# Patient Record
Sex: FEMALE | Race: WHITE | NOT HISPANIC OR LATINO | Employment: FULL TIME | URBAN - METROPOLITAN AREA
[De-identification: names, ages, dates, MRNs, and addresses within clinical notes are randomized per-mention and may not be internally consistent; named-entity substitution may affect disease eponyms.]

---

## 2023-07-31 ENCOUNTER — OFFICE VISIT (OUTPATIENT)
Dept: URGENT CARE | Facility: URGENT CARE | Age: 23
End: 2023-07-31
Payer: COMMERCIAL

## 2023-07-31 VITALS
WEIGHT: 145 LBS | HEART RATE: 112 BPM | OXYGEN SATURATION: 97 % | DIASTOLIC BLOOD PRESSURE: 78 MMHG | SYSTOLIC BLOOD PRESSURE: 113 MMHG | TEMPERATURE: 98.7 F

## 2023-07-31 DIAGNOSIS — R11.2 NAUSEA AND VOMITING, UNSPECIFIED VOMITING TYPE: Primary | ICD-10-CM

## 2023-07-31 DIAGNOSIS — Z32.01 PREGNANCY TEST POSITIVE: ICD-10-CM

## 2023-07-31 PROBLEM — I49.8 OTHER SPECIFIED CARDIAC ARRHYTHMIAS: Status: ACTIVE | Noted: 2018-01-22

## 2023-07-31 PROBLEM — G44.229 CHRONIC TENSION HEADACHE: Status: ACTIVE | Noted: 2017-09-20

## 2023-07-31 PROBLEM — G90.A POTS (POSTURAL ORTHOSTATIC TACHYCARDIA SYNDROME): Status: ACTIVE | Noted: 2018-01-22

## 2023-07-31 LAB
BASOPHILS # BLD AUTO: 0 10E3/UL (ref 0–0.2)
BASOPHILS NFR BLD AUTO: 0 %
EOSINOPHIL # BLD AUTO: 0.1 10E3/UL (ref 0–0.7)
EOSINOPHIL NFR BLD AUTO: 1 %
ERYTHROCYTE [DISTWIDTH] IN BLOOD BY AUTOMATED COUNT: 12.4 % (ref 10–15)
HCG UR QL: POSITIVE
HCT VFR BLD AUTO: 43 % (ref 35–47)
HGB BLD-MCNC: 14.2 G/DL (ref 11.7–15.7)
IMM GRANULOCYTES # BLD: 0 10E3/UL
IMM GRANULOCYTES NFR BLD: 0 %
LYMPHOCYTES # BLD AUTO: 1.5 10E3/UL (ref 0.8–5.3)
LYMPHOCYTES NFR BLD AUTO: 14 %
MCH RBC QN AUTO: 28.6 PG (ref 26.5–33)
MCHC RBC AUTO-ENTMCNC: 33 G/DL (ref 31.5–36.5)
MCV RBC AUTO: 87 FL (ref 78–100)
MONOCYTES # BLD AUTO: 0.6 10E3/UL (ref 0–1.3)
MONOCYTES NFR BLD AUTO: 6 %
NEUTROPHILS # BLD AUTO: 8.1 10E3/UL (ref 1.6–8.3)
NEUTROPHILS NFR BLD AUTO: 79 %
PLATELET # BLD AUTO: 285 10E3/UL (ref 150–450)
RBC # BLD AUTO: 4.97 10E6/UL (ref 3.8–5.2)
WBC # BLD AUTO: 10.2 10E3/UL (ref 4–11)

## 2023-07-31 PROCEDURE — 83690 ASSAY OF LIPASE: CPT | Performed by: PHYSICIAN ASSISTANT

## 2023-07-31 PROCEDURE — 99204 OFFICE O/P NEW MOD 45 MIN: CPT | Performed by: PHYSICIAN ASSISTANT

## 2023-07-31 PROCEDURE — 36415 COLL VENOUS BLD VENIPUNCTURE: CPT | Performed by: PHYSICIAN ASSISTANT

## 2023-07-31 PROCEDURE — 80053 COMPREHEN METABOLIC PANEL: CPT | Performed by: PHYSICIAN ASSISTANT

## 2023-07-31 PROCEDURE — 85025 COMPLETE CBC W/AUTO DIFF WBC: CPT | Performed by: PHYSICIAN ASSISTANT

## 2023-07-31 PROCEDURE — 81025 URINE PREGNANCY TEST: CPT | Performed by: PHYSICIAN ASSISTANT

## 2023-07-31 RX ORDER — ONDANSETRON 4 MG/1
4 TABLET, ORALLY DISINTEGRATING ORAL EVERY 8 HOURS PRN
Qty: 15 TABLET | Refills: 0 | Status: SHIPPED | OUTPATIENT
Start: 2023-07-31

## 2023-07-31 RX ORDER — FAMOTIDINE 20 MG/1
20 TABLET, FILM COATED ORAL 2 TIMES DAILY
Qty: 60 TABLET | Refills: 0 | Status: SHIPPED | OUTPATIENT
Start: 2023-07-31

## 2023-07-31 RX ORDER — PRENATAL VIT/IRON FUM/FOLIC AC 27MG-0.8MG
1 TABLET ORAL DAILY
Qty: 90 TABLET | Refills: 3 | Status: SHIPPED | OUTPATIENT
Start: 2023-07-31

## 2023-07-31 ASSESSMENT — ENCOUNTER SYMPTOMS
RESPIRATORY NEGATIVE: 1
SORE THROAT: 0
ABDOMINAL PAIN: 0
DIARRHEA: 0
CARDIOVASCULAR NEGATIVE: 1
NAUSEA: 1
FEVER: 0
MYALGIAS: 1
VOMITING: 1
CHILLS: 0

## 2023-07-31 ASSESSMENT — PAIN SCALES - GENERAL: PAINLEVEL: NO PAIN (0)

## 2023-07-31 NOTE — PATIENT INSTRUCTIONS
We are checking labs today. We call if you have any abnormal results.     I've prescribed a nausea medication- ondansetron and a stomach medication-famotidine.     Follow up if you are not improving with these medications.       Maureen Zambrano PA-C

## 2023-08-01 LAB
ALBUMIN SERPL BCG-MCNC: 4.8 G/DL (ref 3.5–5.2)
ALP SERPL-CCNC: 56 U/L (ref 35–104)
ALT SERPL W P-5'-P-CCNC: 8 U/L (ref 0–50)
ANION GAP SERPL CALCULATED.3IONS-SCNC: 15 MMOL/L (ref 7–15)
AST SERPL W P-5'-P-CCNC: 16 U/L (ref 0–45)
BILIRUB SERPL-MCNC: 0.4 MG/DL
BUN SERPL-MCNC: 7 MG/DL (ref 6–20)
CALCIUM SERPL-MCNC: 9.8 MG/DL (ref 8.6–10)
CHLORIDE SERPL-SCNC: 100 MMOL/L (ref 98–107)
CREAT SERPL-MCNC: 0.61 MG/DL (ref 0.51–0.95)
DEPRECATED HCO3 PLAS-SCNC: 20 MMOL/L (ref 22–29)
GFR SERPL CREATININE-BSD FRML MDRD: >90 ML/MIN/1.73M2
GLUCOSE SERPL-MCNC: 76 MG/DL (ref 70–99)
LIPASE SERPL-CCNC: 29 U/L (ref 13–60)
POTASSIUM SERPL-SCNC: 3.8 MMOL/L (ref 3.4–5.3)
PROT SERPL-MCNC: 7.8 G/DL (ref 6.4–8.3)
SODIUM SERPL-SCNC: 135 MMOL/L (ref 136–145)

## 2023-08-13 ENCOUNTER — HEALTH MAINTENANCE LETTER (OUTPATIENT)
Age: 23
End: 2023-08-13

## 2024-10-03 ENCOUNTER — OFFICE VISIT (OUTPATIENT)
Dept: URGENT CARE | Facility: URGENT CARE | Age: 24
End: 2024-10-03
Payer: COMMERCIAL

## 2024-10-03 VITALS
HEART RATE: 111 BPM | WEIGHT: 164 LBS | OXYGEN SATURATION: 100 % | DIASTOLIC BLOOD PRESSURE: 86 MMHG | RESPIRATION RATE: 16 BRPM | SYSTOLIC BLOOD PRESSURE: 121 MMHG | TEMPERATURE: 97.8 F

## 2024-10-03 DIAGNOSIS — R11.0 NAUSEA: ICD-10-CM

## 2024-10-03 DIAGNOSIS — R19.7 DIARRHEA, UNSPECIFIED TYPE: ICD-10-CM

## 2024-10-03 DIAGNOSIS — R10.32 ABDOMINAL PAIN, LEFT LOWER QUADRANT: Primary | ICD-10-CM

## 2024-10-03 LAB
ALBUMIN SERPL-MCNC: 4.5 G/DL (ref 3.4–5)
ALP SERPL-CCNC: 62 U/L (ref 40–150)
ALT SERPL W P-5'-P-CCNC: 11 U/L (ref 0–50)
ANION GAP SERPL CALCULATED.3IONS-SCNC: 8 MMOL/L (ref 3–14)
AST SERPL W P-5'-P-CCNC: 22 U/L (ref 0–45)
BASOPHILS # BLD AUTO: 0.1 10E3/UL (ref 0–0.2)
BASOPHILS NFR BLD AUTO: 1 %
BILIRUB SERPL-MCNC: 0.7 MG/DL (ref 0.2–1.3)
BUN SERPL-MCNC: 8 MG/DL (ref 7–30)
CALCIUM SERPL-MCNC: 10.5 MG/DL (ref 8.5–10.1)
CHLORIDE BLD-SCNC: 104 MMOL/L (ref 94–109)
CO2 SERPL-SCNC: 30 MMOL/L (ref 20–32)
CREAT SERPL-MCNC: 0.8 MG/DL (ref 0.52–1.04)
EGFRCR SERPLBLD CKD-EPI 2021: >90 ML/MIN/1.73M2
EOSINOPHIL # BLD AUTO: 0.1 10E3/UL (ref 0–0.7)
EOSINOPHIL NFR BLD AUTO: 1 %
ERYTHROCYTE [DISTWIDTH] IN BLOOD BY AUTOMATED COUNT: 13.5 % (ref 10–15)
GLUCOSE BLD-MCNC: 97 MG/DL (ref 70–99)
HCT VFR BLD AUTO: 42.6 % (ref 35–47)
HGB BLD-MCNC: 13.5 G/DL (ref 11.7–15.7)
IMM GRANULOCYTES # BLD: 0 10E3/UL
IMM GRANULOCYTES NFR BLD: 0 %
LYMPHOCYTES # BLD AUTO: 2.1 10E3/UL (ref 0.8–5.3)
LYMPHOCYTES NFR BLD AUTO: 20 %
MCH RBC QN AUTO: 27.3 PG (ref 26.5–33)
MCHC RBC AUTO-ENTMCNC: 31.7 G/DL (ref 31.5–36.5)
MCV RBC AUTO: 86 FL (ref 78–100)
MONOCYTES # BLD AUTO: 0.6 10E3/UL (ref 0–1.3)
MONOCYTES NFR BLD AUTO: 6 %
NEUTROPHILS # BLD AUTO: 7.9 10E3/UL (ref 1.6–8.3)
NEUTROPHILS NFR BLD AUTO: 73 %
PLATELET # BLD AUTO: 337 10E3/UL (ref 150–450)
POTASSIUM BLD-SCNC: 4.6 MMOL/L (ref 3.4–5.3)
PROT SERPL-MCNC: 8.5 G/DL (ref 6.8–8.8)
RBC # BLD AUTO: 4.94 10E6/UL (ref 3.8–5.2)
SODIUM SERPL-SCNC: 142 MMOL/L (ref 135–145)
WBC # BLD AUTO: 10.8 10E3/UL (ref 4–11)

## 2024-10-03 PROCEDURE — 82150 ASSAY OF AMYLASE: CPT | Performed by: FAMILY MEDICINE

## 2024-10-03 PROCEDURE — 83690 ASSAY OF LIPASE: CPT | Performed by: FAMILY MEDICINE

## 2024-10-03 PROCEDURE — 85025 COMPLETE CBC W/AUTO DIFF WBC: CPT | Performed by: FAMILY MEDICINE

## 2024-10-03 PROCEDURE — 36415 COLL VENOUS BLD VENIPUNCTURE: CPT | Performed by: FAMILY MEDICINE

## 2024-10-03 PROCEDURE — 99214 OFFICE O/P EST MOD 30 MIN: CPT | Performed by: FAMILY MEDICINE

## 2024-10-03 PROCEDURE — 80053 COMPREHEN METABOLIC PANEL: CPT | Performed by: FAMILY MEDICINE

## 2024-10-03 RX ORDER — ONDANSETRON 4 MG/1
4 TABLET, ORALLY DISINTEGRATING ORAL EVERY 8 HOURS PRN
Qty: 12 TABLET | Refills: 0 | Status: SHIPPED | OUTPATIENT
Start: 2024-10-03

## 2024-10-03 NOTE — PATIENT INSTRUCTIONS
Okay for tylenol for discomfort  Okay for zofran to help with nausea symptoms    Please follow up with GI specialist for further evaluation    Please establish with primary provider for further evaluation    We will contact you if pending labs are not normal

## 2024-10-03 NOTE — PROGRESS NOTES
SUBJECTIVE:  Chief Complaint   Patient presents with    Abdominal Pain     C/o lower left abdominal pain, nausea, diarrhea on/off x4-6 weeks but has gotten worse over the past 2 weeks     Khai Calix is a 24 year old female who presents with a chief complaint of abdominal pain, nausea, diarrhea for 4-6 weeks, worsening in the past 2 weeks.    Intermittent initially, sometimes through out the day, has been for over a month.  Concerned as she noticed blood in stools.  Pain is usually left sided, sometimes will be more higher.  Overall dull pain.  This has become more severe in the last few weeks, more persistent.  Will be worse with certain movement.  Has been more fatigue.  Had been more nauseated.    No change in foods, no one else with similar symptoms.  No travel recently.      No urinary symptoms., no concerns for pregnancy.    No Family history of GI     PCP- none currently    No past medical history on file.  Current Outpatient Medications   Medication Sig Dispense Refill    ondansetron (ZOFRAN ODT) 4 MG ODT tab Take 1 tablet (4 mg) by mouth every 8 hours as needed for nausea or vomiting. 12 tablet 0    famotidine (PEPCID) 20 MG tablet Take 1 tablet (20 mg) by mouth 2 times daily (Patient not taking: Reported on 10/3/2024) 60 tablet 0    ondansetron (ZOFRAN ODT) 4 MG ODT tab Take 1 tablet (4 mg) by mouth every 8 hours as needed for nausea (Patient not taking: Reported on 10/3/2024) 15 tablet 0    Prenatal Vit-Fe Fumarate-FA (PRENATAL MULTIVITAMIN W/IRON) 27-0.8 MG tablet Take 1 tablet by mouth daily (Patient not taking: Reported on 10/3/2024) 90 tablet 3     Social History     Tobacco Use    Smoking status: Never    Smokeless tobacco: Never   Substance Use Topics    Alcohol use: Not on file       ROS:  Review of systems negative except as stated above.    EXAM:   /86 (BP Location: Right arm, Patient Position: Sitting, Cuff Size: Adult Regular)   Pulse 111   Temp 97.8  F (36.6  C) (Temporal)   Resp  16   Wt 74.4 kg (164 lb)   LMP 09/23/2024 (Exact Date)   SpO2 100%   GENERAL APPEARANCE: healthy, alert and no distress  PSYCH:alert, affect bright    Results for orders placed or performed in visit on 10/03/24   Comprehensive metabolic panel     Status: Abnormal   Result Value Ref Range    Sodium 142 135 - 145 mmol/L    Potassium 4.6 3.4 - 5.3 mmol/L    Chloride 104 94 - 109 mmol/L    Carbon Dioxide (CO2) 30 20 - 32 mmol/L    Anion Gap 8 3 - 14 mmol/L    Urea Nitrogen 8 7 - 30 mg/dL    Creatinine 0.80 0.52 - 1.04 mg/dL    GFR Estimate >90 >60 mL/min/1.73m2    Calcium 10.5 (H) 8.5 - 10.1 mg/dL    Glucose 97 70 - 99 mg/dL    Alkaline Phosphatase 62 40 - 150 U/L    AST 22 0 - 45 U/L    ALT 11 0 - 50 U/L    Protein Total 8.5 6.8 - 8.8 g/dL    Albumin 4.5 3.4 - 5.0 g/dL    Bilirubin Total 0.7 0.2 - 1.3 mg/dL   CBC with platelets and differential     Status: None   Result Value Ref Range    WBC Count 10.8 4.0 - 11.0 10e3/uL    RBC Count 4.94 3.80 - 5.20 10e6/uL    Hemoglobin 13.5 11.7 - 15.7 g/dL    Hematocrit 42.6 35.0 - 47.0 %    MCV 86 78 - 100 fL    MCH 27.3 26.5 - 33.0 pg    MCHC 31.7 31.5 - 36.5 g/dL    RDW 13.5 10.0 - 15.0 %    Platelet Count 337 150 - 450 10e3/uL    % Neutrophils 73 %    % Lymphocytes 20 %    % Monocytes 6 %    % Eosinophils 1 %    % Basophils 1 %    % Immature Granulocytes 0 %    Absolute Neutrophils 7.9 1.6 - 8.3 10e3/uL    Absolute Lymphocytes 2.1 0.8 - 5.3 10e3/uL    Absolute Monocytes 0.6 0.0 - 1.3 10e3/uL    Absolute Eosinophils 0.1 0.0 - 0.7 10e3/uL    Absolute Basophils 0.1 0.0 - 0.2 10e3/uL    Absolute Immature Granulocytes 0.0 <=0.4 10e3/uL   CBC with platelets and differential     Status: None    Narrative    The following orders were created for panel order CBC with platelets and differential.  Procedure                               Abnormality         Status                     ---------                               -----------         ------                     CBC with  platelets and d...[[id number]]                      Final result                 Please view results for these tests on the individual orders.       ASSESSMENT/PLAN:  (R10.32) Abdominal pain, left lower quadrant  (primary encounter diagnosis)  Plan: CBC with platelets and differential,         Comprehensive metabolic panel, Lipase, Amylase,        Enteric Bacteria and Virus Panel by MARIANO Stool,         Adult GI  Referral - Consult Only            (R11.0) Nausea  Plan: CBC with platelets and differential,         Comprehensive metabolic panel, Lipase, Amylase,        Adult GI  Referral - Consult Only,         ondansetron (ZOFRAN ODT) 4 MG ODT tab            (R19.7) Diarrhea, unspecified type  Plan: CBC with platelets and differential,         Comprehensive metabolic panel, Enteric Bacteria        and Virus Panel by MARIANO Stool            Reassurance given, vitals stable and initial labs within normal parameters.  Discussed that will require further evaluation by primary provider and probably GI.  Referral placed for GI.  Reviewed that may require colonoscopy and/or CT scan or ultrasound for further evaluation, this is outside UC capability.  To ER if any acute worsening of symptoms.    Will follow up on pending labs and notify if any abnormalities.  Stool testing obtained for bacterial and viral etiology and treat as appropriate.    RX zofran given to help with nausea symptoms.  Okay for tylenol for discomfort.        Encourage to establish care for follow up with primary provider in 1 week  Follow up with GI specialist in 1-2 weeks    Pérez Victor MD  October 3, 2024 5:04 PM

## 2024-10-04 LAB
AMYLASE SERPL-CCNC: 58 U/L (ref 28–100)
LIPASE SERPL-CCNC: 24 U/L (ref 13–60)

## 2024-10-06 ENCOUNTER — HEALTH MAINTENANCE LETTER (OUTPATIENT)
Age: 24
End: 2024-10-06

## 2024-10-08 ENCOUNTER — TELEPHONE (OUTPATIENT)
Dept: GASTROENTEROLOGY | Facility: CLINIC | Age: 24
End: 2024-10-08
Payer: COMMERCIAL

## 2024-10-22 ENCOUNTER — HOSPITAL ENCOUNTER (EMERGENCY)
Facility: CLINIC | Age: 24
Discharge: HOME | End: 2024-10-22
Attending: EMERGENCY MEDICINE | Admitting: EMERGENCY MEDICINE
Payer: COMMERCIAL

## 2024-10-22 ENCOUNTER — APPOINTMENT (OUTPATIENT)
Dept: CT IMAGING | Facility: CLINIC | Age: 24
End: 2024-10-22
Attending: EMERGENCY MEDICINE
Payer: COMMERCIAL

## 2024-10-22 VITALS
SYSTOLIC BLOOD PRESSURE: 125 MMHG | WEIGHT: 167.99 LBS | OXYGEN SATURATION: 100 % | DIASTOLIC BLOOD PRESSURE: 85 MMHG | HEART RATE: 97 BPM | TEMPERATURE: 99.6 F | RESPIRATION RATE: 16 BRPM | BODY MASS INDEX: 29.77 KG/M2 | HEIGHT: 63 IN

## 2024-10-22 DIAGNOSIS — R10.9 ABDOMINAL PAIN, UNSPECIFIED ABDOMINAL LOCATION: ICD-10-CM

## 2024-10-22 LAB
ALBUMIN SERPL BCG-MCNC: 4.5 G/DL (ref 3.5–5.2)
ALBUMIN UR-MCNC: NEGATIVE MG/DL
ALP SERPL-CCNC: 64 U/L (ref 40–150)
ALT SERPL W P-5'-P-CCNC: 13 U/L (ref 0–50)
ANION GAP SERPL CALCULATED.3IONS-SCNC: 15 MMOL/L (ref 7–15)
APPEARANCE UR: CLEAR
AST SERPL W P-5'-P-CCNC: 20 U/L (ref 0–45)
BASOPHILS # BLD AUTO: 0.1 10E3/UL (ref 0–0.2)
BASOPHILS NFR BLD AUTO: 1 %
BILIRUB SERPL-MCNC: 0.2 MG/DL
BILIRUB UR QL STRIP: NEGATIVE
BUN SERPL-MCNC: 8.9 MG/DL (ref 6–20)
CALCIUM SERPL-MCNC: 9.4 MG/DL (ref 8.8–10.4)
CHLORIDE SERPL-SCNC: 101 MMOL/L (ref 98–107)
COLOR UR AUTO: ABNORMAL
CREAT SERPL-MCNC: 0.69 MG/DL (ref 0.51–0.95)
EGFRCR SERPLBLD CKD-EPI 2021: >90 ML/MIN/1.73M2
EOSINOPHIL # BLD AUTO: 0.1 10E3/UL (ref 0–0.7)
EOSINOPHIL NFR BLD AUTO: 1 %
ERYTHROCYTE [DISTWIDTH] IN BLOOD BY AUTOMATED COUNT: 13.9 % (ref 10–15)
GLUCOSE SERPL-MCNC: 97 MG/DL (ref 70–99)
GLUCOSE UR STRIP-MCNC: NEGATIVE MG/DL
HCG SERPL QL: NEGATIVE
HCO3 SERPL-SCNC: 22 MMOL/L (ref 22–29)
HCT VFR BLD AUTO: 39.3 % (ref 35–47)
HGB BLD-MCNC: 12.6 G/DL (ref 11.7–15.7)
HGB UR QL STRIP: NEGATIVE
HOLD SPECIMEN: NORMAL
IMM GRANULOCYTES # BLD: 0 10E3/UL
IMM GRANULOCYTES NFR BLD: 0 %
KETONES UR STRIP-MCNC: NEGATIVE MG/DL
LEUKOCYTE ESTERASE UR QL STRIP: NEGATIVE
LIPASE SERPL-CCNC: 29 U/L (ref 13–60)
LYMPHOCYTES # BLD AUTO: 2.7 10E3/UL (ref 0.8–5.3)
LYMPHOCYTES NFR BLD AUTO: 24 %
MCH RBC QN AUTO: 26.9 PG (ref 26.5–33)
MCHC RBC AUTO-ENTMCNC: 32.1 G/DL (ref 31.5–36.5)
MCV RBC AUTO: 84 FL (ref 78–100)
MONOCYTES # BLD AUTO: 0.8 10E3/UL (ref 0–1.3)
MONOCYTES NFR BLD AUTO: 8 %
MUCOUS THREADS #/AREA URNS LPF: PRESENT /LPF
NEUTROPHILS # BLD AUTO: 7.2 10E3/UL (ref 1.6–8.3)
NEUTROPHILS NFR BLD AUTO: 66 %
NITRATE UR QL: NEGATIVE
NRBC # BLD AUTO: 0 10E3/UL
NRBC BLD AUTO-RTO: 0 /100
PH UR STRIP: 5.5 [PH] (ref 5–7)
PLATELET # BLD AUTO: 330 10E3/UL (ref 150–450)
POTASSIUM SERPL-SCNC: 4.2 MMOL/L (ref 3.4–5.3)
PROT SERPL-MCNC: 7.6 G/DL (ref 6.4–8.3)
RBC # BLD AUTO: 4.68 10E6/UL (ref 3.8–5.2)
RBC URINE: 1 /HPF
SODIUM SERPL-SCNC: 138 MMOL/L (ref 135–145)
SP GR UR STRIP: 1.01 (ref 1–1.03)
SQUAMOUS EPITHELIAL: 2 /HPF
UROBILINOGEN UR STRIP-MCNC: NORMAL MG/DL
WBC # BLD AUTO: 11 10E3/UL (ref 4–11)
WBC URINE: 2 /HPF

## 2024-10-22 PROCEDURE — 80053 COMPREHEN METABOLIC PANEL: CPT | Performed by: EMERGENCY MEDICINE

## 2024-10-22 PROCEDURE — 99285 EMERGENCY DEPT VISIT HI MDM: CPT | Mod: 25

## 2024-10-22 PROCEDURE — 74177 CT ABD & PELVIS W/CONTRAST: CPT

## 2024-10-22 PROCEDURE — 85025 COMPLETE CBC W/AUTO DIFF WBC: CPT | Performed by: EMERGENCY MEDICINE

## 2024-10-22 PROCEDURE — 83690 ASSAY OF LIPASE: CPT | Performed by: EMERGENCY MEDICINE

## 2024-10-22 PROCEDURE — 36415 COLL VENOUS BLD VENIPUNCTURE: CPT | Performed by: EMERGENCY MEDICINE

## 2024-10-22 PROCEDURE — 81003 URINALYSIS AUTO W/O SCOPE: CPT | Performed by: EMERGENCY MEDICINE

## 2024-10-22 PROCEDURE — 84703 CHORIONIC GONADOTROPIN ASSAY: CPT | Performed by: EMERGENCY MEDICINE

## 2024-10-22 PROCEDURE — 85004 AUTOMATED DIFF WBC COUNT: CPT | Performed by: EMERGENCY MEDICINE

## 2024-10-22 PROCEDURE — 93005 ELECTROCARDIOGRAM TRACING: CPT

## 2024-10-22 PROCEDURE — 82040 ASSAY OF SERUM ALBUMIN: CPT | Performed by: EMERGENCY MEDICINE

## 2024-10-22 PROCEDURE — 250N000009 HC RX 250: Performed by: EMERGENCY MEDICINE

## 2024-10-22 PROCEDURE — 81001 URINALYSIS AUTO W/SCOPE: CPT | Performed by: EMERGENCY MEDICINE

## 2024-10-22 PROCEDURE — 250N000011 HC RX IP 250 OP 636: Performed by: EMERGENCY MEDICINE

## 2024-10-22 RX ORDER — IOPAMIDOL 755 MG/ML
500 INJECTION, SOLUTION INTRAVASCULAR ONCE
Status: COMPLETED | OUTPATIENT
Start: 2024-10-22 | End: 2024-10-22

## 2024-10-22 RX ADMIN — IOPAMIDOL 84 ML: 755 INJECTION, SOLUTION INTRAVENOUS at 20:12

## 2024-10-22 RX ADMIN — SODIUM CHLORIDE 61 ML: 9 INJECTION, SOLUTION INTRAVENOUS at 20:12

## 2024-10-22 ASSESSMENT — COLUMBIA-SUICIDE SEVERITY RATING SCALE - C-SSRS
1. IN THE PAST MONTH, HAVE YOU WISHED YOU WERE DEAD OR WISHED YOU COULD GO TO SLEEP AND NOT WAKE UP?: NO
6. HAVE YOU EVER DONE ANYTHING, STARTED TO DO ANYTHING, OR PREPARED TO DO ANYTHING TO END YOUR LIFE?: NO
2. HAVE YOU ACTUALLY HAD ANY THOUGHTS OF KILLING YOURSELF IN THE PAST MONTH?: NO

## 2024-10-22 ASSESSMENT — ACTIVITIES OF DAILY LIVING (ADL)
ADLS_ACUITY_SCORE: 35
ADLS_ACUITY_SCORE: 35

## 2024-10-22 NOTE — ED TRIAGE NOTES
Patient ambulatory reporting left sided abdominal pain for the last two months. Was seen at  3 weeks ago and blood work was normal. Over the last week, pain has increased.

## 2024-10-23 NOTE — ED PROVIDER NOTES
"  Emergency Department Note      History of Present Illness     Chief Complaint   Abdominal Pain    HPI   Khai Calix is a 24 year old female who presents for LUQ abdominal pain. Patient reports that she was seen at urgent care three weeks ago for LLQ pain and her lab results were normal so she was discharged with a GI referral. Patient says that she is unable to get an appointment for a couple weeks and her abdominal pain has been getting more severe and is now in the LUQ. She describes the pain as constant and dull, and that she occasionally gets a sharp pain. Khai has had intermittent bloody stool and says that there was more blood in her stool this morning than there has been in the past. She says that the stool was more loose today but not watery. Patient endorses nausea and denies fever, vomiting, or rectal pain with bowel movements. She has history of appendectomy.     Independent Historian   None    Review of External Notes       Past Medical History     Medical History and Problem List   POTS  Vocal cord disease   Chronic tension headache   Cardia arrhythmia     Medications   Pepcid     Surgical History   Appendectomy     Physical Exam     Patient Vitals for the past 24 hrs:   BP Temp Temp src Pulse Resp SpO2 Height Weight   10/22/24 1815 (!) 143/90 99.6  F (37.6  C) Temporal (!) 129 14 98 % 1.6 m (5' 3\") 76.2 kg (167 lb 15.9 oz)     Physical Exam  Gen: well appearing, in no acute distress  Oral : Mucous membranes moist,   Nose: No rhinorhea  Ears: External near normal, without drainage  Eyes: periorbital tissues and sclera normal   Neck: supple, no abnormal swelling  Lungs: Clear bilaterally, no tachypnea or distress, speaks full sentences  CV: Regular rate, regular rhythm  Abd: Mild LUQ tenderness. Soft, nondistended, no rebound/guarding  Ext: no lower extremity edema  Skin: warm, dry, well perfused, no rashes/bruising/lesions on exposed skin  Neuro: alert, no gross motor or sensory deficits, "   Psych: pleasant mood, normal affect     Diagnostics     Lab Results   Labs Ordered and Resulted from Time of ED Arrival to Time of ED Departure   ROUTINE UA WITH MICROSCOPIC REFLEX TO CULTURE - Abnormal       Result Value    Color Urine Light Yellow      Appearance Urine Clear      Glucose Urine Negative      Bilirubin Urine Negative      Ketones Urine Negative      Specific Gravity Urine 1.013      Blood Urine Negative      pH Urine 5.5      Protein Albumin Urine Negative      Urobilinogen Urine Normal      Nitrite Urine Negative      Leukocyte Esterase Urine Negative      Mucus Urine Present (*)     RBC Urine 1      WBC Urine 2      Squamous Epithelials Urine 2 (*)    COMPREHENSIVE METABOLIC PANEL - Normal    Sodium 138      Potassium 4.2      Carbon Dioxide (CO2) 22      Anion Gap 15      Urea Nitrogen 8.9      Creatinine 0.69      GFR Estimate >90      Calcium 9.4      Chloride 101      Glucose 97      Alkaline Phosphatase 64      AST 20      ALT 13      Protein Total 7.6      Albumin 4.5      Bilirubin Total 0.2     LIPASE - Normal    Lipase 29     HCG QUALITATIVE PREGNANCY - Normal    hCG Serum Qualitative Negative     CBC WITH PLATELETS AND DIFFERENTIAL    WBC Count 11.0      RBC Count 4.68      Hemoglobin 12.6      Hematocrit 39.3      MCV 84      MCH 26.9      MCHC 32.1      RDW 13.9      Platelet Count 330      % Neutrophils 66      % Lymphocytes 24      % Monocytes 8      % Eosinophils 1      % Basophils 1      % Immature Granulocytes 0      NRBCs per 100 WBC 0      Absolute Neutrophils 7.2      Absolute Lymphocytes 2.7      Absolute Monocytes 0.8      Absolute Eosinophils 0.1      Absolute Basophils 0.1      Absolute Immature Granulocytes 0.0      Absolute NRBCs 0.0       Imaging   CT Abdomen Pelvis w Contrast   Final Result   IMPRESSION:    1.  Collapsing left ovarian cyst measuring 2.2 cm, no follow up needed.        EKG   None     Independent Interpretation   None    ED Course      Medications  Administered   Medications   iopamidol (ISOVUE-370) solution 500 mL (84 mLs Intravenous $Given 10/22/24 2012)   CT scan flush (61 mLs Intravenous $Given 10/22/24 2012)     Procedures   None      Discussion of Management   None    ED Course   ED Course as of 10/22/24 2100   Tue Oct 22, 2024   1945 I evaluated the patient, obtained history, and performed a physical exam as detailed above.    2050 I rechecked on the patient and explained the plan for discharge. They are comfortable with this plan.      Additional Documentation  None    Medical Decision Making / Diagnosis     CMS Diagnoses: None    MIPS   None    MDM   Khai Calix is a 24 year old female presents with abdominal pain, concern for red blood in her stools at time.  Waiting for GI follow-up appointment in about 3 weeks.  She has been having this pain for several weeks now, comes and goes but also has a chronic unwavering component.  By history she is not exhibiting symptoms of constipation, her labs are within normal limits CT scan shows a small collapsing ovarian cyst.  Not suspicious of torsion.  Visualizing her CT scan there is a fairly large stool load in the colon.  Discussed supportive measures like suppositories, MiraLAX and high-fiber diet.  Patient seems a bit frustrated that we are not uncovering obvious problem with her bowels.  I did my best to reassure the patient and offer a few interventions she can try at home to see if that helps.  She was tachycardic at check-in but not on repeat assessment by myself at no concern for surgical abdomen at this time.    Disposition   The patient was discharged.     Diagnosis     ICD-10-CM    1. Abdominal pain, unspecified abdominal location  R10.9            Discharge Medications   New Prescriptions    No medications on file     Scribe Disclosure:  I, Tari Amin, am serving as a scribe at 7:51 PM on 10/22/2024 to document services personally performed by Jimi Barrett MD based on my  observations and the provider's statements to me.        Jimi Barrett MD  10/22/24 2100

## 2024-10-23 NOTE — DISCHARGE INSTRUCTIONS
Try to eat a high-fiber diet or take a fiber supplement like Metamucil.    Purchase some polyethylene glycol or MiraLAX. This can be purchased without a prescription over-the-counter at any pharmacy. Begin with 1 capful daily until you are having soft comfortable stools.    Purchase a bottle of magnesium citrate. This liquid can help promote a bowel movement. This can be purchased without a prescription from any pharmacy.    Purchase some glycerine suppositories. These are special pills that he can put in your rectum to help soften stool and promote a bowel movement. They be purchased at any pharmacy without a prescription.

## 2024-11-11 ENCOUNTER — TELEPHONE (OUTPATIENT)
Dept: GASTROENTEROLOGY | Facility: CLINIC | Age: 24
End: 2024-11-11
Payer: COMMERCIAL

## 2024-11-11 NOTE — TELEPHONE ENCOUNTER
Called to remind patient of their upcoming appointment with our GI clinic, on 11/13 with Dr. Cheri Hood. This appointment is scheduled as an in-person appt. Please arrive 15 minutes early to check in for your appointment. , if your appointment is virtual (video or telephone) you need to be in [place] for the visit. To reschedule or cancel appointment patient needs to call [phone] option 1.  Patient verbalized understanding.    Tamera Giron

## 2024-11-13 ENCOUNTER — OFFICE VISIT (OUTPATIENT)
Dept: GASTROENTEROLOGY | Facility: CLINIC | Age: 24
End: 2024-11-13
Attending: FAMILY MEDICINE
Payer: COMMERCIAL

## 2024-11-13 VITALS
OXYGEN SATURATION: 98 % | SYSTOLIC BLOOD PRESSURE: 123 MMHG | HEART RATE: 101 BPM | BODY MASS INDEX: 29.95 KG/M2 | WEIGHT: 169 LBS | DIASTOLIC BLOOD PRESSURE: 83 MMHG | HEIGHT: 63 IN

## 2024-11-13 DIAGNOSIS — K62.5 BRBPR (BRIGHT RED BLOOD PER RECTUM): ICD-10-CM

## 2024-11-13 DIAGNOSIS — R11.0 NAUSEA: ICD-10-CM

## 2024-11-13 DIAGNOSIS — R10.32 ABDOMINAL PAIN, LEFT LOWER QUADRANT: Primary | ICD-10-CM

## 2024-11-13 ASSESSMENT — PAIN SCALES - GENERAL: PAINLEVEL_OUTOF10: NO PAIN (0)

## 2024-11-13 NOTE — PATIENT INSTRUCTIONS
It was a pleasure meeting with you today and discussing your healthcare plan. Below is a summary of what we covered:    Please take miralax every other day  Please schedule a colonoscopy      Please see below for any additional questions and scheduling guidelines.    Sign up for [employer]: [employer] patient portal serves as a secure platform for accessing your medical records from the [facility]. Additionally, [employer] facilitates easy, timely, and secure messaging with your care team. If you have not signed up, you may do so by using the provided code or calling [phone].    Coordinating your care after your visit:  There are multiple options for scheduling your follow-up care based on your provider's recommendation.    How do I schedule a follow-up clinic appointment:   After your appointment, you may receive scheduling assistance with the Clinic Coordinators by having a seat in the waiting room and a Clinic Coordinator will call you up to schedule.  Virtual visits or after you leave the clinic:  Your provider has placed a follow-up order in the [employer] portal for scheduling your return appointment. A member of the scheduling team will contact you to schedule.  [employer] Scheduling: Timely scheduling through [employer] is advised to ensure appointment availability.   Call to schedule: You may schedule your follow-up appointment(s) by calling [phone], option 1.    How do I schedule my endoscopy or colonoscopy procedure:  If a procedure, such as a colonoscopy or upper endoscopy was ordered by your provider, the scheduling team will contact you to schedule this procedure. Or you may choose to call to schedule at   [phone], option 2.  Please allow 20-30 minutes when scheduling a procedure.    How do I get my blood work done? To get your blood work done, you need to schedule a lab appointment at an [facility]. There are multiple ways to schedule:   At the clinic: The Clinic  Coordinator you meet after your visit can help you schedule a lab appointment.   [employer] scheduling: [employer] offers online lab scheduling at all [facility] laboratory locations.   Call to schedule: You can call [phone] to schedule your lab appointment.    How do I schedule my imaging study: To schedule imaging studies, such as CT scans, ultrasounds, MRIs, or X-rays, contact Imaging Services at [phone].    How do I schedule a referral to another doctor: If your provider recommended a referral to another specialist(s), the referral order was placed by your provider. You will receive a phone call to schedule this referral, or you may choose to call the number attached to the referral to self-schedule.    For Post-Visit Question(s):  For any inquiries following today's visit:  Please utilize [employer] messaging and allow 48 hours for reply or contact the Call Center during normal business hours at [phone], option 3.  For Emergent After-hours questions, contact the On-Call GI Fellow through the [facility]  at [phone].  In addition, you may contact your Nurse directly using the provided contact information.    Test Results:  Test results will be accessible via [employer] in compliance with the 21st Century Cures Act. This means that your results will be available to you at the same time as your provider. Often you may see your results before your provider does. Results are reviewed by staff within two weeks with communication follow-up. Results may be released in the patient portal prior to your care team review.    Prescription Refill(s):  Medication prescribed by your provider will be addressed during your visit. For future refills, please coordinate with your pharmacy. If you have not had a recent clinic visit or routine labs, for your safety, your provider may not be able to refill your prescription.

## 2024-11-13 NOTE — NURSING NOTE
"Chief Complaint   Patient presents with    New Patient     Abdominal pain     She requests these members of her care team be copied on today's visit information:  PCP: No PCP    Referring Provider:  Pérez Victor MD  [address] [facility] [place] [address]    Vitals:    11/13/24 0855   BP: 123/83   Pulse: 101   SpO2: 98%   Weight: 76.7 kg (169 lb)   Height: 1.6 m (5' 3\")     Body mass index is 29.94 kg/m .    Medications were reconciled.    Tamera Giron      "

## 2024-11-13 NOTE — LETTER
11/13/2024      Khai Calix  [address]  [place] [address]      Dear Colleague,    Thank you for referring your patient, Khai Calix, to the [facility]. Please see a copy of my visit note below.    GI CLINIC VISIT           ASSESSMENT/PLAN:    # abdominal pain - resolved - ?if related to large stool burden noted on CT scan.  Will have her start miralax every other day to prevent recurrence - can adjust as needed.    # hematochezia - likely outlet bleeding, labs fortunately normal. Given persistent bleeding and changes in bowel habits should be investigated further with colonoscopy - patient prefers MAC. Orders placed today    RTC as needed after colonoscopy      CC/REFERRING MD:  Pérez Victor  REASON FOR CONSULTATION:   Pérez Victor for   Chief Complaint   Patient presents with     New Patient     Abdominal pain         HPI    Khai presents today to discuss abdominal pain - pain was mainly in the LUQ and LLQ. Some associated nausea but no vomiting. Has also been having blood mixed with stool. No changes in medications around the time this started. Initially presented to urgent care with these symptoms on 10/3 - labs at the time were unrevealing and referral to GI placed. Presented to the ER on 10/22 with persistent symptoms - had a CT scan which was notable for large stool burden but otherwise unrevealing. Was recommended to use miralax but she instead tried magnesium citrate - had some soft bowel movements with this. Has been feeling better this week - abdominal pain has resolved.  Having 2-3 soft small bowel movements a day although still seeing blood in the stool.    No family history of IBD. No autoimmune disease that run in the family. Khai has a history of POTS.  Had an appendectomy in 2019 - no other abdominal surgeries.  No prior endoscopies  ROS:    No fevers or chills  No weight loss  No blurry vision, double vision or change in vision  No sore throat  No  lymphadenopathy  No headache, paraesthesias, or weakness in a limb  No shortness of breath or wheezing  No chest pain or pressure  No arthralgias or myalgias  No rashes or skin changes  No odynophagia or dysphagia  No BRBPR, hematochezia, melena  No dysuria, frequency or urgency  No hot/cold intolerance or polyria  No anxiety or depression    PROBLEM LIST  Patient Active Problem List    Diagnosis Date Noted     POTS (postural orthostatic tachycardia syndrome) 01/22/2018     Priority: Medium     Formatting of this note is different from the original. Echo 2/19/18  1. Normal left ventricular systolic function.  2. Normal origins of the right and left coronary arteries.       Other specified cardiac arrhythmias 01/22/2018     Priority: Medium     Formatting of this note is different from the original. Formatting of this note is different from the original. Echo 2/19/18  1. Normal left ventricular systolic function.  2. Normal origins of the right and left coronary arteries.       Chronic tension headache 09/20/2017     Priority: Medium     Vocal cord disease 09/09/2015     Priority: Medium     Formatting of this note might be different from the original. Unsure if vocal cord dysfunction is the cause of exercise associated dyspnea and chest pain.  Feels like she can't get air out.  Sometimes gets dizzy and feels chest pain. It never happens when sitting at home. This started summer, 2015.  Flex laryngoscopy by Voice Therapy on 9/9/15 with some twitchiness of vocal cords at rest thought to be consistent with vocal cord dysfunction. Speech therapy techniques taught but no improvement in symptoms. Seen by Pulmonary 11/15 who did not think it was asthma and recommended Cardiology consult.  That has not been done and Khai and Mom (per Khai telling me) are interested in accomplishing this consult. 6/29/16: Cardiology consult placed.  EKG ordered         PERTINENT PAST MEDICAL HISTORY:  POTS  PREVIOUS  SURGERIES:  appendectomy    PREVIOUS ENDOSCOPY:  none    ALLERGIES:   No Known Allergies    PERTINENT MEDICATIONS:    Current Outpatient Medications:      ondansetron (ZOFRAN ODT) 4 MG ODT tab, Take 1 tablet (4 mg) by mouth every 8 hours as needed for nausea or vomiting., Disp: 12 tablet, Rfl: 0     famotidine (PEPCID) 20 MG tablet, Take 1 tablet (20 mg) by mouth 2 times daily (Patient not taking: Reported on 11/13/2024), Disp: 60 tablet, Rfl: 0     Prenatal Vit-Fe Fumarate-FA (PRENATAL MULTIVITAMIN W/IRON) 27-0.8 MG tablet, Take 1 tablet by mouth daily (Patient not taking: Reported on 11/13/2024), Disp: 90 tablet, Rfl: 3    SOCIAL HISTORY:  Social History     Socioeconomic History     Marital status: Single     Spouse name: Not on file     Number of children: Not on file     Years of education: Not on file     Highest education level: Not on file   Occupational History     Not on file   Tobacco Use     Smoking status: Never     Smokeless tobacco: Never   Vaping Use     Vaping status: Never Used   Substance and Sexual Activity     Alcohol use: Not on file     Drug use: Not on file     Sexual activity: Not on file   Other Topics Concern     Not on file   Social History Narrative     Not on file     Social Drivers of Health     Financial Resource Strain: High Risk (1/1/2022)    [facility], [facility]    Financial Resource Strain      Difficulty of Paying Living Expenses: Not on file      Difficulty of Paying Living Expenses: Not on file   Food Insecurity: Not on file   Transportation Needs: Not on file   Physical Activity: Insufficiently Active (10/3/2019)    Received from [facility]    Exercise Vital Sign      Days of Exercise per Week: 2 days      Minutes of Exercise per Session: 60 min   Stress: No Stress Concern Present (10/3/2019)    Received from [facility]    [place] [place] [employer] - Occupational Stress  "Questionnaire      Feeling of Stress : Not at all   Social Connections: Unknown (12/23/2022)    Received from [facility], [facility]    Social Connections      Frequency of Communication with Friends and Family: Not on file   Interpersonal Safety: Not on file   Housing Stability: Not on file       FAMILY HISTORY:  No family history on file.    Past/family/social history reviewed and no changes    PHYSICAL EXAMINATION:  Constitutional: aaox3, cooperative, pleasant, not dyspneic/diaphoretic, no acute distress  Vitals reviewed: /83   Pulse 101   Ht 1.6 m (5' 3\")   Wt 76.7 kg (169 lb)   LMP 09/23/2024 (Exact Date)   SpO2 98%   BMI 29.94 kg/m    Wt:   Wt Readings from Last 2 Encounters:   11/13/24 76.7 kg (169 lb)   10/22/24 76.2 kg (167 lb 15.9 oz)      Eyes: Sclera anicteric/injected  Ears/nose/mouth/throat: Normal oropharynx without ulcers or exudate, mucus membranes moist, hearing intact  Neck: supple, thyroid normal size  CV: No edema  Respiratory: Unlabored breathing  Lymph: No axillary, submandibular, supraclavicular or inguinal lymphadenopathy  Abd: soft, Nondistended, no hepatosplenomegaly, nontender, no peritoneal signs  Skin: warm, perfused, no jaundice  Psych: Normal affect  MSK: Normal gait      PERTINENT STUDIES:  Most recent CBC:  Recent Labs   Lab Test 10/22/24  1900 10/03/24  1628   WBC 11.0 10.8   HGB 12.6 13.5   HCT 39.3 42.6    337     Most recent hepatic panel:  Recent Labs   Lab Test 10/22/24  1900 10/03/24  1627   ALT 13 11   AST 20 22     Most recent creatinine:  Recent Labs   Lab Test 10/22/24  1900 10/03/24  1627   CR 0.69 0.80                  Again, thank you for allowing me to participate in the care of your patient.        Sincerely,        Cheri Hood, DO  "

## 2024-11-13 NOTE — PROGRESS NOTES
GI CLINIC VISIT           ASSESSMENT/PLAN:    # abdominal pain - resolved - ?if related to large stool burden noted on CT scan.  Will have her start miralax every other day to prevent recurrence - can adjust as needed.    # hematochezia - likely outlet bleeding, labs fortunately normal. Given persistent bleeding and changes in bowel habits should be investigated further with colonoscopy - patient prefers MAC. Orders placed today    RTC as needed after colonoscopy      CC/REFERRING MD:  Pérez Victor  REASON FOR CONSULTATION:   Pérez Victor for   Chief Complaint   Patient presents with    New Patient     Abdominal pain         HPI    Khai presents today to discuss abdominal pain - pain was mainly in the LUQ and LLQ. Some associated nausea but no vomiting. Has also been having blood mixed with stool. No changes in medications around the time this started. Initially presented to urgent care with these symptoms on 10/3 - labs at the time were unrevealing and referral to GI placed. Presented to the ER on 10/22 with persistent symptoms - had a CT scan which was notable for large stool burden but otherwise unrevealing. Was recommended to use miralax but she instead tried magnesium citrate - had some soft bowel movements with this. Has been feeling better this week - abdominal pain has resolved.  Having 2-3 soft small bowel movements a day although still seeing blood in the stool.    No family history of IBD. No autoimmune disease that run in the family. Khai has a history of POTS.  Had an appendectomy in 2019 - no other abdominal surgeries.  No prior endoscopies  ROS:    No fevers or chills  No weight loss  No blurry vision, double vision or change in vision  No sore throat  No lymphadenopathy  No headache, paraesthesias, or weakness in a limb  No shortness of breath or wheezing  No chest pain or pressure  No arthralgias or myalgias  No rashes or skin changes  No odynophagia or dysphagia  No BRBPR, hematochezia, melena  No  dysuria, frequency or urgency  No hot/cold intolerance or polyria  No anxiety or depression    PROBLEM LIST  Patient Active Problem List    Diagnosis Date Noted    POTS (postural orthostatic tachycardia syndrome) 01/22/2018     Priority: Medium     Formatting of this note is different from the original. Echo 2/19/18  1. Normal left ventricular systolic function.  2. Normal origins of the right and left coronary arteries.      Other specified cardiac arrhythmias 01/22/2018     Priority: Medium     Formatting of this note is different from the original. Formatting of this note is different from the original. Echo 2/19/18  1. Normal left ventricular systolic function.  2. Normal origins of the right and left coronary arteries.      Chronic tension headache 09/20/2017     Priority: Medium    Vocal cord disease 09/09/2015     Priority: Medium     Formatting of this note might be different from the original. Unsure if vocal cord dysfunction is the cause of exercise associated dyspnea and chest pain.  Feels like she can't get air out.  Sometimes gets dizzy and feels chest pain. It never happens when sitting at home. This started summer, 2015.  Flex laryngoscopy by Voice Therapy on 9/9/15 with some twitchiness of vocal cords at rest thought to be consistent with vocal cord dysfunction. Speech therapy techniques taught but no improvement in symptoms. Seen by Pulmonary 11/15 who did not think it was asthma and recommended Cardiology consult.  That has not been done and Khai and Mom (per Khai telling me) are interested in accomplishing this consult. 6/29/16: Cardiology consult placed.  EKG ordered         PERTINENT PAST MEDICAL HISTORY:  POTS  PREVIOUS SURGERIES:  appendectomy    PREVIOUS ENDOSCOPY:  none    ALLERGIES:   No Known Allergies    PERTINENT MEDICATIONS:    Current Outpatient Medications:     ondansetron (ZOFRAN ODT) 4 MG ODT tab, Take 1 tablet (4 mg) by mouth every 8 hours as needed for nausea or vomiting., Disp:  12 tablet, Rfl: 0    famotidine (PEPCID) 20 MG tablet, Take 1 tablet (20 mg) by mouth 2 times daily (Patient not taking: Reported on 11/13/2024), Disp: 60 tablet, Rfl: 0    Prenatal Vit-Fe Fumarate-FA (PRENATAL MULTIVITAMIN W/IRON) 27-0.8 MG tablet, Take 1 tablet by mouth daily (Patient not taking: Reported on 11/13/2024), Disp: 90 tablet, Rfl: 3    SOCIAL HISTORY:  Social History     Socioeconomic History    Marital status: Single     Spouse name: Not on file    Number of children: Not on file    Years of education: Not on file    Highest education level: Not on file   Occupational History    Not on file   Tobacco Use    Smoking status: Never    Smokeless tobacco: Never   Vaping Use    Vaping status: Never Used   Substance and Sexual Activity    Alcohol use: Not on file    Drug use: Not on file    Sexual activity: Not on file   Other Topics Concern    Not on file   Social History Narrative    Not on file     Social Drivers of Health     Financial Resource Strain: High Risk (1/1/2022)    Received from [facility], [facility]    Financial Resource Strain     Difficulty of Paying Living Expenses: Not on file     Difficulty of Paying Living Expenses: Not on file   Food Insecurity: Not on file   Transportation Needs: Not on file   Physical Activity: Insufficiently Active (10/3/2019)    Received from [facility]    Exercise Vital Sign     Days of Exercise per Week: 2 days     Minutes of Exercise per Session: 60 min   Stress: No Stress Concern Present (10/3/2019)    Received from [facility]    [place] [place] [employer] - Occupational Stress Questionnaire     Feeling of Stress : Not at all   Social Connections: Unknown (12/23/2022)    Received from [facility], [facility]    Social Connections     Frequency of Communication with Friends and Family: Not on file   Interpersonal  "Safety: Not on file   Housing Stability: Not on file       FAMILY HISTORY:  No family history on file.    Past/family/social history reviewed and no changes    PHYSICAL EXAMINATION:  Constitutional: aaox3, cooperative, pleasant, not dyspneic/diaphoretic, no acute distress  Vitals reviewed: /83   Pulse 101   Ht 1.6 m (5' 3\")   Wt 76.7 kg (169 lb)   LMP 09/23/2024 (Exact Date)   SpO2 98%   BMI 29.94 kg/m    Wt:   Wt Readings from Last 2 Encounters:   11/13/24 76.7 kg (169 lb)   10/22/24 76.2 kg (167 lb 15.9 oz)      Eyes: Sclera anicteric/injected  Ears/nose/mouth/throat: Normal oropharynx without ulcers or exudate, mucus membranes moist, hearing intact  Neck: supple, thyroid normal size  CV: No edema  Respiratory: Unlabored breathing  Lymph: No axillary, submandibular, supraclavicular or inguinal lymphadenopathy  Abd: soft, Nondistended, no hepatosplenomegaly, nontender, no peritoneal signs  Skin: warm, perfused, no jaundice  Psych: Normal affect  MSK: Normal gait      PERTINENT STUDIES:  Most recent CBC:  Recent Labs   Lab Test 10/22/24  1900 10/03/24  1628   WBC 11.0 10.8   HGB 12.6 13.5   HCT 39.3 42.6    337     Most recent hepatic panel:  Recent Labs   Lab Test 10/22/24  1900 10/03/24  1627   ALT 13 11   AST 20 22     Most recent creatinine:  Recent Labs   Lab Test 10/22/24  1900 10/03/24  1627   CR 0.69 0.80              "

## 2024-11-20 ENCOUNTER — TELEPHONE (OUTPATIENT)
Dept: GASTROENTEROLOGY | Facility: CLINIC | Age: 24
End: 2024-11-20
Payer: COMMERCIAL

## 2024-11-20 NOTE — TELEPHONE ENCOUNTER
"Endoscopy Scheduling Screen    Have you had any respiratory illness or flu-like symptoms in the last 10 days?  No      What is your communication preference for Instructions and/or Bowel Prep?   MyChart      What insurance is in the chart?  Other:  bcbs    Ordering/Referring Provider: ban   (If ordering provider performs procedure, schedule with ordering provider unless otherwise instructed. )    BMI: Estimated body mass index is 29.94 kg/m  as calculated from the following:    Height as of 11/13/24: 1.6 m (5' 3\").    Weight as of 11/13/24: 76.7 kg (169 lb).       Sedation Ordered  MAC/deep sedation.   BMI<= 45 45 < BMI <= 48 48 < BMI < = 50  BMI > 50   No Restrictions No [facility]  No [facility]  [facility] with exceptions Hospital Only OR Only         Do you have a history of malignant hyperthermia?  No      (Females) Are you currently pregnant?   No       Have you been diagnosed or told you have pulmonary hypertension?   No      Do you have an LVAD?  No      Have you been told you have moderate to severe sleep apnea?  No.      Have you been told you have COPD, asthma, or any other lung disease?  No      Do you have any heart conditions?  Yes   POTS    In the past year, have you had any hospitalizations for heart related issues including cardiomyopathy, heart attack, or stent placement?  No    Do you have any implantable devices in your body (pacemaker, ICD)?  No    Do you take nitroglycerine?  No        Have you ever had or are you waiting for an organ transplant?  No. Continue scheduling, no site restrictions.      Have you had a stroke or transient ischemic attack (TIA aka \"mini stroke\" in the last 6 months?   No      Have you been diagnosed with or been told you have cirrhosis of the liver?   No.      Are you currently on dialysis?   No      Do you need assistance transferring?   No    BMI: Estimated body mass index is 29.94 kg/m  as calculated from the following:    Height as of 11/13/24: 1.6 m (5' 3\").    " Weight as of 11/13/24: 76.7 kg (169 lb).     Is patients BMI > 40 and scheduling location [facility]?  No      Do you take an injectable or oral medication for weight loss or diabetes (excluding insulin)?  No      Do you take the medication Naltrexone?  No      Do you take blood thinners?  No       Prep   Are you currently on dialysis or do you have chronic kidney disease?  No      Do you have a diagnosis of diabetes?  No      Do you have a diagnosis of cystic fibrosis (CF)?  No    On a regular basis do you go 3 -5 days between bowel movements?  No      BMI > 40?  No    Preferred Pharmacy:    [facility] - [address]   [address]   [address]  [place] [address]  Phone: [phone] Fax: [phone]      Final Scheduling Details     Procedure scheduled  Colonoscopy    Surgeon:  TREVON     Date of procedure:  TBD     STAFF MSSG TO DR KOENIG REQUESTING UPDATED ORDER WITH CS AS MAC SCHEDULE IS OUT TO MARCH AND PATIENT CANNOT WAIT THAT LONG.   MAC IS HER PREFERRED SEDATION BUT SHE STATED SHE WOULD BE OK WITH CS IF THAT WOULD ALLOW HER TO BE SCOPED IN A TIMELIER FASHION.     WILL CALL HER BACK WHEN RESPONSE IS RECEIVED.

## 2024-11-21 DIAGNOSIS — R11.0 NAUSEA: ICD-10-CM

## 2024-11-21 DIAGNOSIS — K62.5 BRBPR (BRIGHT RED BLOOD PER RECTUM): ICD-10-CM

## 2024-11-21 DIAGNOSIS — R10.32 ABDOMINAL PAIN, LEFT LOWER QUADRANT: Primary | ICD-10-CM

## 2024-11-21 NOTE — TELEPHONE ENCOUNTER
PER 11/20/24 STAFF MESSAGE, DR KOENIG APPROVED CONSCIOUS SEDATION FOR THE PROCEDURE.   2ND MESSAGE SENT TO PROVIDER REQUESTING ORDER BE UPDATED TO REFLECT THAT OR NEW ORDER BE REPLACED.    Final Scheduling Details     Procedure scheduled  Colonoscopy      Surgeon:  ARYA     Date of procedure:  12/16/24     Pre-OP / PAC:   No - Not required for this site.    Location  [facility] - [facility] - Per order.    Sedation   Moderate Sedation  CS approved per Dr Koenig, per pt request.      Patient Reminders:   You will receive a call from a Nurse to review instructions and health history.  This assessment must be completed prior to your procedure.  Failure to complete the Nurse assessment may result in the procedure being cancelled.      On the day of your procedure, please designate an adult(s) who can drive you home stay with you for the next 24 hours. The medicines used in the exam will make you sleepy. You will not be able to drive.      You cannot take public transportation, ride share services, or non-medical taxi service without a responsible caregiver.  Medical transport services are allowed with the requirement that a responsible caregiver will receive you at your destination.  We require that drivers and caregivers are confirmed prior to your procedure.

## 2024-12-02 RX ORDER — PROCHLORPERAZINE MALEATE 10 MG
10 TABLET ORAL EVERY 6 HOURS PRN
Status: CANCELLED | OUTPATIENT
Start: 2024-12-02

## 2024-12-02 RX ORDER — NALOXONE HYDROCHLORIDE 0.4 MG/ML
0.4 INJECTION, SOLUTION INTRAMUSCULAR; INTRAVENOUS; SUBCUTANEOUS
Status: CANCELLED | OUTPATIENT
Start: 2024-12-02

## 2024-12-02 RX ORDER — NALOXONE HYDROCHLORIDE 0.4 MG/ML
0.2 INJECTION, SOLUTION INTRAMUSCULAR; INTRAVENOUS; SUBCUTANEOUS
Status: CANCELLED | OUTPATIENT
Start: 2024-12-02

## 2024-12-02 RX ORDER — FLUMAZENIL 0.1 MG/ML
0.2 INJECTION, SOLUTION INTRAVENOUS
Status: CANCELLED | OUTPATIENT
Start: 2024-12-02 | End: 2024-12-03

## 2024-12-02 RX ORDER — ONDANSETRON 4 MG/1
4 TABLET, ORALLY DISINTEGRATING ORAL EVERY 6 HOURS PRN
Status: CANCELLED | OUTPATIENT
Start: 2024-12-02

## 2024-12-02 RX ORDER — ONDANSETRON 2 MG/ML
4 INJECTION INTRAMUSCULAR; INTRAVENOUS EVERY 6 HOURS PRN
Status: CANCELLED | OUTPATIENT
Start: 2024-12-02

## 2024-12-04 ENCOUNTER — TELEPHONE (OUTPATIENT)
Dept: GASTROENTEROLOGY | Facility: CLINIC | Age: 24
End: 2024-12-04
Payer: COMMERCIAL

## 2024-12-04 NOTE — TELEPHONE ENCOUNTER
Attempted to contact patient in order to complete pre assessment questions.     No answer. Left message to return call to [phone] option 2.    Callback communication sent via [employer].    Alice Cornell LPN

## 2024-12-04 NOTE — TELEPHONE ENCOUNTER
Pre visit planning completed.      Procedure details:    Patient scheduled for Colonoscopy on 12/16/24.     Approximate arrival time: 1350. Procedure time 1435.   *Ensure patient is aware that endoscopy team will be calling about 2 days prior to procedure date to confirm arrival time as this may change.     Facility location: [facility]; [address], [place] [address]. Check in location: 2nd Floor at Surgery desk.  *Disclaimer: Drivers are to check in with patient and stay on campus during procedure.     Sedation type: Conscious sedation     Pre op exam needed? No.    Indication for procedure: screening      Chart review:     Electronic implanted devices? No    Recent diagnosis of diverticulitis within the last 6 weeks? No      Medication review:    Diabetic? No    Anticoagulants? No    Weight loss medication/injectable? No GLP-1 medication per patient's medication list. Nursing to verify with pre-assessment call.    Other medication HOLDING recommendations:  N/A      Prep for procedure:     Bowel prep recommendation: Standard Miralax  Due to: standard bowel prep    Prep instructions sent via [employer]         Corinne Kliber, RN  Endoscopy Procedure Pre Assessment   [phone] option 2

## 2024-12-04 NOTE — TELEPHONE ENCOUNTER
Pt coming in for   Abdominal pain, left lower quadrant [R10.32]  - Primary      Nausea [R11.0]      BRBPR (bright red blood per rectum) [K62.5]        Not screening.     Pre assessment completed for upcoming procedure.   (Please see previous telephone encounter notes for complete details)    Patient returned call.       Procedure details:    Approximate time and facility location reviewed.   Patient is aware that endoscopy team will be calling about 2 days prior to confirm arrival time.    Designated  policy reviewed and that  requests drivers to check in and stay on campus.   *Disclaimer - please notify the  RN GI staff with any  issues/concerns.     Instructed to have someone stay 6  hours post procedure.     Medication review:    Medications reviewed. Please see supporting documentation below. Holding recommendations discussed (if applicable).       Prep for procedure:     Procedure prep instructions reviewed.        Any additional information needed:  N/A      Patient verbalized understanding and had no questions or concerns at this time.      Georgette Tsang RN  Endoscopy Procedure Pre Assessment   [phone] option 2

## 2024-12-12 ENCOUNTER — TELEPHONE (OUTPATIENT)
Dept: GASTROENTEROLOGY | Facility: CLINIC | Age: 24
End: 2024-12-12
Payer: COMMERCIAL

## 2024-12-12 NOTE — TELEPHONE ENCOUNTER
Left voicemail of arrival time of 1:50 PM.     [employer] message sent with updated arrival time.

## 2024-12-16 ENCOUNTER — HOSPITAL ENCOUNTER (OUTPATIENT)
Facility: AMBULATORY SURGERY CENTER | Age: 24
Discharge: HOME | End: 2024-12-16
Attending: INTERNAL MEDICINE | Admitting: INTERNAL MEDICINE
Payer: COMMERCIAL

## 2024-12-16 VITALS
TEMPERATURE: 98.8 F | SYSTOLIC BLOOD PRESSURE: 104 MMHG | OXYGEN SATURATION: 99 % | DIASTOLIC BLOOD PRESSURE: 71 MMHG | HEART RATE: 81 BPM | RESPIRATION RATE: 16 BRPM

## 2024-12-16 LAB
COLONOSCOPY: NORMAL
HCG UR QL: NEGATIVE

## 2024-12-16 PROCEDURE — G8918 PT W/O PREOP ORDER IV AB PRO: HCPCS

## 2024-12-16 PROCEDURE — G8907 PT DOC NO EVENTS ON DISCHARG: HCPCS

## 2024-12-16 PROCEDURE — 81025 URINE PREGNANCY TEST: CPT | Performed by: INTERNAL MEDICINE

## 2024-12-16 PROCEDURE — 45380 COLONOSCOPY AND BIOPSY: CPT

## 2024-12-16 RX ORDER — LIDOCAINE 40 MG/G
CREAM TOPICAL
Status: DISCONTINUED | OUTPATIENT
Start: 2024-12-16 | End: 2024-12-17 | Stop reason: HOSPADM

## 2024-12-16 RX ORDER — FENTANYL CITRATE 50 UG/ML
INJECTION, SOLUTION INTRAMUSCULAR; INTRAVENOUS PRN
Status: DISCONTINUED | OUTPATIENT
Start: 2024-12-16 | End: 2024-12-16 | Stop reason: HOSPADM

## 2024-12-16 RX ORDER — DIPHENHYDRAMINE HYDROCHLORIDE 50 MG/ML
INJECTION INTRAMUSCULAR; INTRAVENOUS PRN
Status: DISCONTINUED | OUTPATIENT
Start: 2024-12-16 | End: 2024-12-16 | Stop reason: HOSPADM

## 2024-12-16 RX ORDER — ONDANSETRON 2 MG/ML
4 INJECTION INTRAMUSCULAR; INTRAVENOUS
Status: DISCONTINUED | OUTPATIENT
Start: 2024-12-16 | End: 2024-12-17 | Stop reason: HOSPADM

## 2024-12-16 NOTE — H&P
[facility] Anesthesia Pre-op History and Physical    Khai Calix MRN# [id number]   Age: 24 year old YOB: 2000            Date of Exam 12/16/2024         Primary care provider: No Ref-Primary, Physician         Chief Complaint and/or Reason for Procedure:     Rectal bleeding         Active problem list:     Patient Active Problem List    Diagnosis Date Noted    POTS (postural orthostatic tachycardia syndrome) 01/22/2018     Priority: Medium     Formatting of this note is different from the original. Echo 2/19/18  1. Normal left ventricular systolic function.  2. Normal origins of the right and left coronary arteries.      Other specified cardiac arrhythmias 01/22/2018     Priority: Medium     Formatting of this note is different from the original. Formatting of this note is different from the original. Echo 2/19/18  1. Normal left ventricular systolic function.  2. Normal origins of the right and left coronary arteries.      Chronic tension headache 09/20/2017     Priority: Medium    Vocal cord disease 09/09/2015     Priority: Medium     Formatting of this note might be different from the original. Unsure if vocal cord dysfunction is the cause of exercise associated dyspnea and chest pain.  Feels like she can't get air out.  Sometimes gets dizzy and feels chest pain. It never happens when sitting at home. This started summer, 2015.  Flex laryngoscopy by Voice Therapy on 9/9/15 with some twitchiness of vocal cords at rest thought to be consistent with vocal cord dysfunction. Speech therapy techniques taught but no improvement in symptoms. Seen by Pulmonary 11/15 who did not think it was asthma and recommended Cardiology consult.  That has not been done and Khai and Mom (per Khai telling me) are interested in accomplishing this consult. 6/29/16: Cardiology consult placed.  EKG ordered              Medications (include herbals and vitamins):   Any Plavix use in the last 7 days? No     Current  Outpatient Medications   Medication Sig Dispense Refill    famotidine (PEPCID) 20 MG tablet Take 1 tablet (20 mg) by mouth 2 times daily 60 tablet 0    ondansetron (ZOFRAN ODT) 4 MG ODT tab Take 1 tablet (4 mg) by mouth every 8 hours as needed for nausea or vomiting. 12 tablet 0    Prenatal Vit-Fe Fumarate-FA (PRENATAL MULTIVITAMIN W/IRON) 27-0.8 MG tablet Take 1 tablet by mouth daily 90 tablet 3     Current Facility-Administered Medications   Medication Dose Route Frequency Provider Last Rate Last Admin    lidocaine (LMX4) kit   Topical Q1H PRN Cheri Hood DO        lidocaine 1 % 0.1-1 mL  0.1-1 mL Other Q1H PRN Cheri Hood DO        ondansetron (ZOFRAN) injection 4 mg  4 mg Intravenous Once PRN Cheri Hood DO        sodium chloride (PF) 0.9% PF flush 3 mL  3 mL Intracatheter Q8H Cheri Hood DO        sodium chloride (PF) 0.9% PF flush 3 mL  3 mL Intracatheter q1 min prn Cheri Hood DO                 Allergies:    No Known Allergies  Allergy to Latex? No  Allergy to tape?   No  Intolerances:             Physical Exam:   All vitals have been reviewed  Patient Vitals for the past 8 hrs:   BP Temp Temp src Pulse Resp SpO2   12/16/24 1408 111/72 98.8  F (37.1  C) Temporal 98 16 100 %     No intake/output data recorded.  Lungs:   no increased work of breathing     Cardiovascular:   RRR             Lab / Radiology Results:            Anesthetic risk and/or ASA classification:   2    Cheri Hood DO

## 2024-12-18 LAB
PATH REPORT.COMMENTS IMP SPEC: NORMAL
PATH REPORT.COMMENTS IMP SPEC: NORMAL
PATH REPORT.FINAL DX SPEC: NORMAL
PATH REPORT.GROSS SPEC: NORMAL
PATH REPORT.MICROSCOPIC SPEC OTHER STN: NORMAL
PATH REPORT.RELEVANT HX SPEC: NORMAL
PHOTO IMAGE: NORMAL

## 2025-04-09 ENCOUNTER — OFFICE VISIT (OUTPATIENT)
Dept: GASTROENTEROLOGY | Facility: CLINIC | Age: 25
End: 2025-04-09
Attending: INTERNAL MEDICINE
Payer: COMMERCIAL

## 2025-04-09 ENCOUNTER — ANCILLARY PROCEDURE (OUTPATIENT)
Dept: GENERAL RADIOLOGY | Facility: CLINIC | Age: 25
End: 2025-04-09
Attending: PHYSICIAN ASSISTANT
Payer: COMMERCIAL

## 2025-04-09 VITALS
HEART RATE: 89 BPM | HEIGHT: 63 IN | DIASTOLIC BLOOD PRESSURE: 79 MMHG | BODY MASS INDEX: 29.59 KG/M2 | WEIGHT: 167 LBS | SYSTOLIC BLOOD PRESSURE: 117 MMHG | OXYGEN SATURATION: 99 %

## 2025-04-09 DIAGNOSIS — R10.32 ABDOMINAL PAIN, LEFT LOWER QUADRANT: Primary | ICD-10-CM

## 2025-04-09 DIAGNOSIS — R11.0 NAUSEA: ICD-10-CM

## 2025-04-09 DIAGNOSIS — K62.5 BRBPR (BRIGHT RED BLOOD PER RECTUM): ICD-10-CM

## 2025-04-09 DIAGNOSIS — R10.32 ABDOMINAL PAIN, LEFT LOWER QUADRANT: ICD-10-CM

## 2025-04-09 PROCEDURE — 74019 RADEX ABDOMEN 2 VIEWS: CPT | Mod: GC | Performed by: RADIOLOGY

## 2025-04-09 ASSESSMENT — PAIN SCALES - GENERAL: PAINLEVEL_OUTOF10: NO PAIN (0)

## 2025-04-09 NOTE — PATIENT INSTRUCTIONS
It was a pleasure meeting with you today and discussing your healthcare plan. Below is a summary of what we covered:    --obtain abdominal x-ray.  --obtain celiac labs.   --will provide further recommendations based on above testing.     Follow up in clinic in 3 months.       Please see below for any additional questions and scheduling guidelines.    Sign up for [facility]: [facility] patient portal serves as a secure platform for accessing your medical records from the [facility]. Additionally, [employer] facilitates easy, timely, and secure messaging with your care team. If you have not signed up, you may do so by using the provided code or calling [phone].    Coordinating your care after your visit:  There are multiple options for scheduling your follow-up care based on your provider's recommendation.    How do I schedule a follow-up clinic appointment:   After your appointment, you may receive scheduling assistance with the Clinic Coordinators by having a seat in the waiting room and a Clinic Coordinator will call you up to schedule.  Virtual visits or after you leave the clinic:  Your provider has placed a follow-up order in the [facility] portal for scheduling your return appointment. A member of the scheduling team will contact you to schedule.  [facility] Scheduling: Timely scheduling through [facility] is advised to ensure appointment availability.   Call to schedule: You may schedule your follow-up appointment(s) by calling [phone], option 1.    How do I schedule my endoscopy or colonoscopy procedure:  If a procedure, such as a colonoscopy or upper endoscopy was ordered by your provider, the scheduling team will contact you to schedule this procedure. Or you may choose to call to schedule at   [phone], option 2.  Please allow 20-30 minutes when scheduling a procedure.    How do I get my blood work done? To get your blood work done, you need to schedule a lab appointment at an [facility]  Laboratory. There are multiple ways to schedule:   At the clinic: The Clinic Coordinator you meet after your visit can help you schedule a lab appointment.   [employer] scheduling: [employer] offers online lab scheduling at all [facility] laboratory locations.   Call to schedule: You can call [phone] to schedule your lab appointment.    How do I schedule my imaging study: To schedule imaging studies, such as CT scans, ultrasounds, MRIs, or X-rays, contact Imaging Services at [phone].    How do I schedule a referral to another doctor: If your provider recommended a referral to another specialist(s), the referral order was placed by your provider. You will receive a phone call to schedule this referral, or you may choose to call the number attached to the referral to self-schedule.    For Post-Visit Question(s):  For any inquiries following today's visit:  Please utilize [employer] messaging and allow 48 hours for reply or contact the Call Center during normal business hours at [phone], option 3.  For Emergent After-hours questions, contact the On-Call GI Fellow through the [facility]  at [phone].  In addition, you may contact your Nurse directly using the provided contact information.    Test Results:  Test results will be accessible via [employer] in compliance with the 21st Century Cures Act. This means that your results will be available to you at the same time as your provider. Often you may see your results before your provider does. Results are reviewed by staff within two weeks with communication follow-up. Results may be released in the patient portal prior to your care team review.    Prescription Refill(s):  Medication prescribed by your provider will be addressed during your visit. For future refills, please coordinate with your pharmacy. If you have not had a recent clinic visit or routine labs, for your safety, your provider may not be able to refill your prescription.

## 2025-04-09 NOTE — LETTER
4/9/2025      Khai Calix  [address]  [place] [address]      Dear Colleague,    Thank you for referring your patient, Khai Calix, to the [facility]. Please see a copy of my visit note below.    GI CLINIC VISIT    CC/REFERRING MD:  Cheri Hood      ASSESSMENT/PLAN:    #abdominal pain  #?constipation  #?IBS  #occasional nausea  #BRBPR  We reviewed colonoscopy results, which were otherwise normal. Reports LLQ abdominal pain that is present about half the days out of the week - unknown triggers or relieving factors. She does have a BM daily. I do wonder though if constipation is still playing a role, has not tried miralax consistently. Will obtain celiac labs and AXR to evaluate stool burden. We also discussed disorders of the gut brain axis, and she is meeting criteria for IBS. Could consider nutrition and GI health psychology referral. Could trial bentyl as needed.   --obtain celiac labs.   --obtain AXR.   --future considerations: if above work up unrevealing. Consider nutrition and GI health psych referral as well as bentyl PRN.         RTC 3 months.     Thank you for this consultation.  It was a pleasure to participate in the care of this patient; please contact us with any further questions.     This note was created with voice recognition software, and while reviewed for accuracy, typos may remain.    Emiliana Sanchez PA-C  Division of Gastroenterology, Hepatology and Nutrition  [facility]    20 minutes spent on the date of the encounter doing chart review, review of test results, patient visit, and documentation          HPI  Patient presents for follow up.     Patient was initially evaluated by Dr. Hood on 11/13/24, please see visit details below:    Khai presents today to discuss abdominal pain - pain was mainly in the LUQ and LLQ. Some associated nausea but no vomiting. Has also been having blood mixed with  "stool. No changes in medications around the time this started. Initially presented to urgent care with these symptoms on 10/3 - labs at the time were unrevealing and referral to GI placed. Presented to the ER on 10/22 with persistent symptoms - had a CT scan which was notable for large stool burden but otherwise unrevealing. Was recommended to use miralax but she instead tried magnesium citrate - had some soft bowel movements with this. Has been feeling better this week - abdominal pain has resolved.  Having 2-3 soft small bowel movements a day although still seeing blood in the stool.     No family history of IBD. No autoimmune disease that run in the family. Khai has a history of POTS.  Had an appendectomy in 2019 - no other abdominal surgeries.  No prior endoscopies    4/9/25:  Colonoscopy done in December 2024 was normal.   Pain continues to be intermittent in nature -- usually localized to LLQ of abdomen - pain is described as a \"cramp\", if she moves or eats something can be more stabbing in nature. Unable to identify triggers. Unable to identify anything that improves the pain. Pain can last a day or two. She will generally have a BM daily described as a bristol type 4, does endorse some pushing, will have BRBPR intermittently. Can have some fecal urgency depending on what she eats. Reports occasional nausea, 1-2x/week. Can come on at random, usually after eating. She takes zofran as needed. Denies vomiting. Patient is wondering about [facility].      Denies daily NSAIDs or Tylenol. Denies use of OTC herbal supplements/weight loss products.      Drinks alcohol once a week (usually 3 drinks).  Denies tobacco products. No recreational drug use.     No family history of GI related malignancy (esophageal, gastric, pancreatic, liver or colon) or family history of IBD/celiac disease.     ROS:    No fevers or chills  No weight loss  +BRBPR on occasion  No anxiety or depression      PROBLEM LIST  Patient Active Problem " List    Diagnosis Date Noted     POTS (postural orthostatic tachycardia syndrome) 01/22/2018     Priority: Medium     Formatting of this note is different from the original. Echo 2/19/18  1. Normal left ventricular systolic function.  2. Normal origins of the right and left coronary arteries.       Other specified cardiac arrhythmias 01/22/2018     Priority: Medium     Formatting of this note is different from the original. Formatting of this note is different from the original. Echo 2/19/18  1. Normal left ventricular systolic function.  2. Normal origins of the right and left coronary arteries.       Chronic tension headache 09/20/2017     Priority: Medium     Vocal cord disease 09/09/2015     Priority: Medium     Formatting of this note might be different from the original. Unsure if vocal cord dysfunction is the cause of exercise associated dyspnea and chest pain.  Feels like she can't get air out.  Sometimes gets dizzy and feels chest pain. It never happens when sitting at home. This started summer, 2015.  Flex laryngoscopy by Voice Therapy on 9/9/15 with some twitchiness of vocal cords at rest thought to be consistent with vocal cord dysfunction. Speech therapy techniques taught but no improvement in symptoms. Seen by Pulmonary 11/15 who did not think it was asthma and recommended Cardiology consult.  That has not been done and Khai and Mom (per Khai telling me) are interested in accomplishing this consult. 6/29/16: Cardiology consult placed.  EKG ordered         PERTINENT PAST MEDICAL HISTORY:  Past Medical History:   Diagnosis Date     POTS (postural orthostatic tachycardia syndrome) 2016       PREVIOUS SURGERIES:  Past Surgical History:   Procedure Laterality Date     APPENDECTOMY  2019     COLONOSCOPY N/A 12/16/2024    Procedure: Colonoscopy;  Surgeon: Cheri Hood DO;  Location: [facility] OR     COLONOSCOPY N/A 12/16/2024    Procedure: COLONOSCOPY, WITH POLYPECTOMY AND BIOPSY;  Surgeon: Cheri Hood  DO;  Location: [facility] OR       PREVIOUS ENDOSCOPY:  See chart.    ALLERGIES:   No Known Allergies    PERTINENT MEDICATIONS:    Current Outpatient Medications:      famotidine (PEPCID) 20 MG tablet, Take 1 tablet (20 mg) by mouth 2 times daily, Disp: 60 tablet, Rfl: 0     ondansetron (ZOFRAN ODT) 4 MG ODT tab, Take 1 tablet (4 mg) by mouth every 8 hours as needed for nausea or vomiting., Disp: 12 tablet, Rfl: 0     Prenatal Vit-Fe Fumarate-FA (PRENATAL MULTIVITAMIN W/IRON) 27-0.8 MG tablet, Take 1 tablet by mouth daily, Disp: 90 tablet, Rfl: 3    SOCIAL HISTORY:  Social History     Socioeconomic History     Marital status: Single     Spouse name: Not on file     Number of children: Not on file     Years of education: Not on file     Highest education level: Not on file   Occupational History     Not on file   Tobacco Use     Smoking status: Never     Smokeless tobacco: Never   Vaping Use     Vaping status: Never Used   Substance and Sexual Activity     Alcohol use: Not on file     Drug use: Not on file     Sexual activity: Not on file   Other Topics Concern     Not on file   Social History Narrative     Not on file     Social Drivers of Health     Financial Resource Strain: Unknown (3/5/2022)    [facility] [place] [facility] [place]    Financial Resource Strain      Overall Financial Strain: 99      Skipped Doctor's Visit: 3      Skipped Medication due to cost: 3      Utility Shut-offs: 3   Food Insecurity: Not on file   Transportation Needs: Not on file   Physical Activity: Insufficiently Active (10/3/2019)    Received from [facility]    Exercise Vital Sign      Days of Exercise per Week: 2 days      Minutes of Exercise per Session: 60 min   Stress: No Stress Concern Present (10/3/2019)    Received from [facility]    [employer] [place] [facility] [employer] - Occupational Stress Questionnaire      Feeling of Stress : Not at all   Social  "Connections: Unknown (12/23/2022)    Received from [facility], [facility]    Social Connections      Frequency of Communication with Friends and Family: Not on file   Interpersonal Safety: Low Risk  (12/16/2024)    Interpersonal Safety      Do you feel physically and emotionally safe where you currently live?: Yes      Within the past 12 months, have you been hit, slapped, kicked or otherwise physically hurt by someone?: No      Within the past 12 months, have you been humiliated or emotionally abused in other ways by your partner or ex-partner?: No   Housing Stability: Not on file       FAMILY HISTORY:  No family history on file.    Past/family/social history reviewed and no changes    PHYSICAL EXAMINATION:  Constitutional: aaox3, cooperative, pleasant, not dyspneic/diaphoretic, no acute distress  Vitals reviewed: /79   Pulse 89   Ht 1.6 m (5' 3\")   Wt 75.8 kg (167 lb)   SpO2 99%   BMI 29.58 kg/m    Wt:   Wt Readings from Last 2 Encounters:   11/13/24 76.7 kg (169 lb)   10/22/24 76.2 kg (167 lb 15.9 oz)      Eyes: Sclera anicteric/injected  Respiratory: Unlabored breathing  Abd: soft,  Nondistended, nontender, no peritoneal signs  Skin: warm, perfused, no jaundice  Psych: Normal affect  MSK: Normal gait      PERTINENT STUDIES:    Office Visit on 10/03/2024   Component Date Value Ref Range Status     Sodium 10/03/2024 142  135 - 145 mmol/L Final     Potassium 10/03/2024 4.6  3.4 - 5.3 mmol/L Final     Chloride 10/03/2024 104  94 - 109 mmol/L Final     Carbon Dioxide (CO2) 10/03/2024 30  20 - 32 mmol/L Final     Anion Gap 10/03/2024 8  3 - 14 mmol/L Final     Urea Nitrogen 10/03/2024 8  7 - 30 mg/dL Final     Creatinine 10/03/2024 0.80  0.52 - 1.04 mg/dL Final     GFR Estimate 10/03/2024 >90  >60 mL/min/1.73m2 Final     Calcium 10/03/2024 10.5 (H)  8.5 - 10.1 mg/dL Final     Glucose 10/03/2024 97  70 - 99 mg/dL Final     Alkaline " Phosphatase 10/03/2024 62  40 - 150 U/L Final     AST 10/03/2024 22  0 - 45 U/L Final     ALT 10/03/2024 11  0 - 50 U/L Final     Protein Total 10/03/2024 8.5  6.8 - 8.8 g/dL Final     Albumin 10/03/2024 4.5  3.4 - 5.0 g/dL Final     Bilirubin Total 10/03/2024 0.7  0.2 - 1.3 mg/dL Final     Lipase 10/03/2024 24  13 - 60 U/L Final     Amylase 10/03/2024 58  28 - 100 U/L Final     WBC Count 10/03/2024 10.8  4.0 - 11.0 10e3/uL Final     RBC Count 10/03/2024 4.94  3.80 - 5.20 10e6/uL Final     Hemoglobin 10/03/2024 13.5  11.7 - 15.7 g/dL Final     Hematocrit 10/03/2024 42.6  35.0 - 47.0 % Final     MCV 10/03/2024 86  78 - 100 fL Final     MCH 10/03/2024 27.3  26.5 - 33.0 pg Final     MCHC 10/03/2024 31.7  31.5 - 36.5 g/dL Final     RDW 10/03/2024 13.5  10.0 - 15.0 % Final     Platelet Count 10/03/2024 337  150 - 450 10e3/uL Final     % Neutrophils 10/03/2024 73  % Final     % Lymphocytes 10/03/2024 20  % Final     % Monocytes 10/03/2024 6  % Final     % Eosinophils 10/03/2024 1  % Final     % Basophils 10/03/2024 1  % Final     % Immature Granulocytes 10/03/2024 0  % Final     Absolute Neutrophils 10/03/2024 7.9  1.6 - 8.3 10e3/uL Final     Absolute Lymphocytes 10/03/2024 2.1  0.8 - 5.3 10e3/uL Final     Absolute Monocytes 10/03/2024 0.6  0.0 - 1.3 10e3/uL Final     Absolute Eosinophils 10/03/2024 0.1  0.0 - 0.7 10e3/uL Final     Absolute Basophils 10/03/2024 0.1  0.0 - 0.2 10e3/uL Final     Absolute Immature Granulocytes 10/03/2024 0.0  <=0.4 10e3/uL Final            Again, thank you for allowing me to participate in the care of your patient.        Sincerely,        Emiliana Sanchez PA-C    Electronically signed

## 2025-04-09 NOTE — PROGRESS NOTES
GI CLINIC VISIT    CC/REFERRING MD:  Cheri Hood      ASSESSMENT/PLAN:    #abdominal pain  #?constipation  #?IBS  #occasional nausea  #BRBPR  We reviewed colonoscopy results, which were otherwise normal. Reports LLQ abdominal pain that is present about half the days out of the week - unknown triggers or relieving factors. She does have a BM daily. I do wonder though if constipation is still playing a role, has not tried miralax consistently. Will obtain celiac labs and AXR to evaluate stool burden. We also discussed disorders of the gut brain axis, and she is meeting criteria for IBS. Could consider nutrition and GI health psychology referral. Could trial bentyl as needed.   --obtain celiac labs.   --obtain AXR.   --future considerations: if above work up unrevealing. Consider nutrition and GI health psych referral as well as bentyl PRN.         RTC 3 months.     Thank you for this consultation.  It was a pleasure to participate in the care of this patient; please contact us with any further questions.     This note was created with voice recognition software, and while reviewed for accuracy, typos may remain.    Emiliana Sanchez PA-C  Division of Gastroenterology, Hepatology and Nutrition  [facility]    20 minutes spent on the date of the encounter doing chart review, review of test results, patient visit, and documentation          HPI  Patient presents for follow up.     Patient was initially evaluated by Dr. Hood on 11/13/24, please see visit details below:    Khai presents today to discuss abdominal pain - pain was mainly in the LUQ and LLQ. Some associated nausea but no vomiting. Has also been having blood mixed with stool. No changes in medications around the time this started. Initially presented to urgent care with these symptoms on 10/3 - labs at the time were unrevealing and referral to GI placed. Presented to the ER on 10/22 with persistent symptoms -  "had a CT scan which was notable for large stool burden but otherwise unrevealing. Was recommended to use miralax but she instead tried magnesium citrate - had some soft bowel movements with this. Has been feeling better this week - abdominal pain has resolved.  Having 2-3 soft small bowel movements a day although still seeing blood in the stool.     No family history of IBD. No autoimmune disease that run in the family. Khai has a history of POTS.  Had an appendectomy in 2019 - no other abdominal surgeries.  No prior endoscopies    4/9/25:  Colonoscopy done in December 2024 was normal.   Pain continues to be intermittent in nature -- usually localized to LLQ of abdomen - pain is described as a \"cramp\", if she moves or eats something can be more stabbing in nature. Unable to identify triggers. Unable to identify anything that improves the pain. Pain can last a day or two. She will generally have a BM daily described as a bristol type 4, does endorse some pushing, will have BRBPR intermittently. Can have some fecal urgency depending on what she eats. Reports occasional nausea, 1-2x/week. Can come on at random, usually after eating. She takes zofran as needed. Denies vomiting. Patient is wondering about MCAS.      Denies daily NSAIDs or Tylenol. Denies use of OTC herbal supplements/weight loss products.      Drinks alcohol once a week (usually 3 drinks).  Denies tobacco products. No recreational drug use.     No family history of GI related malignancy (esophageal, gastric, pancreatic, liver or colon) or family history of IBD/celiac disease.     ROS:    No fevers or chills  No weight loss  +BRBPR on occasion  No anxiety or depression      PROBLEM LIST  Patient Active Problem List    Diagnosis Date Noted    POTS (postural orthostatic tachycardia syndrome) 01/22/2018     Priority: Medium     Formatting of this note is different from the original. Echo 2/19/18  1. Normal left ventricular systolic function.  2. Normal " origins of the right and left coronary arteries.      Other specified cardiac arrhythmias 01/22/2018     Priority: Medium     Formatting of this note is different from the original. Formatting of this note is different from the original. Echo 2/19/18  1. Normal left ventricular systolic function.  2. Normal origins of the right and left coronary arteries.      Chronic tension headache 09/20/2017     Priority: Medium    Vocal cord disease 09/09/2015     Priority: Medium     Formatting of this note might be different from the original. Unsure if vocal cord dysfunction is the cause of exercise associated dyspnea and chest pain.  Feels like she can't get air out.  Sometimes gets dizzy and feels chest pain. It never happens when sitting at home. This started summer, 2015.  Flex laryngoscopy by Voice Therapy on 9/9/15 with some twitchiness of vocal cords at rest thought to be consistent with vocal cord dysfunction. Speech therapy techniques taught but no improvement in symptoms. Seen by Pulmonary 11/15 who did not think it was asthma and recommended Cardiology consult.  That has not been done and Khai and Mom (per Khai telling me) are interested in accomplishing this consult. 6/29/16: Cardiology consult placed.  EKG ordered         PERTINENT PAST MEDICAL HISTORY:  Past Medical History:   Diagnosis Date    POTS (postural orthostatic tachycardia syndrome) 2016       PREVIOUS SURGERIES:  Past Surgical History:   Procedure Laterality Date    APPENDECTOMY  2019    COLONOSCOPY N/A 12/16/2024    Procedure: Colonoscopy;  Surgeon: Cheri Hood DO;  Location: [facility] OR    COLONOSCOPY N/A 12/16/2024    Procedure: COLONOSCOPY, WITH POLYPECTOMY AND BIOPSY;  Surgeon: Cheri Hood DO;  Location: [facility] OR       PREVIOUS ENDOSCOPY:  See chart.    ALLERGIES:   No Known Allergies    PERTINENT MEDICATIONS:    Current Outpatient Medications:     famotidine (PEPCID) 20 MG tablet, Take 1 tablet (20 mg) by mouth 2 times daily, Disp: 60  tablet, Rfl: 0    ondansetron (ZOFRAN ODT) 4 MG ODT tab, Take 1 tablet (4 mg) by mouth every 8 hours as needed for nausea or vomiting., Disp: 12 tablet, Rfl: 0    Prenatal Vit-Fe Fumarate-FA (PRENATAL MULTIVITAMIN W/IRON) 27-0.8 MG tablet, Take 1 tablet by mouth daily, Disp: 90 tablet, Rfl: 3    SOCIAL HISTORY:  Social History     Socioeconomic History    Marital status: Single     Spouse name: Not on file    Number of children: Not on file    Years of education: Not on file    Highest education level: Not on file   Occupational History    Not on file   Tobacco Use    Smoking status: Never    Smokeless tobacco: Never   Vaping Use    Vaping status: Never Used   Substance and Sexual Activity    Alcohol use: Not on file    Drug use: Not on file    Sexual activity: Not on file   Other Topics Concern    Not on file   Social History Narrative    Not on file     Social Drivers of Health     Financial Resource Strain: Unknown (3/5/2022)    [facility]    Financial Resource Strain     Overall Financial Strain: 99     Skipped Doctor's Visit: 3     Skipped Medication due to cost: 3     Utility Shut-offs: 3   Food Insecurity: Not on file   Transportation Needs: Not on file   Physical Activity: Insufficiently Active (10/3/2019)    Received from [facility]    Exercise Vital Sign     Days of Exercise per Week: 2 days     Minutes of Exercise per Session: 60 min   Stress: No Stress Concern Present (10/3/2019)    Received from [facility]    [employer] - Occupational Stress Questionnaire     Feeling of Stress : Not at all   Social Connections: Unknown (12/23/2022)    [facility]    Social Connections     Frequency of Communication with Friends and Family: Not on file   Interpersonal Safety: Low Risk   "(12/16/2024)    Interpersonal Safety     Do you feel physically and emotionally safe where you currently live?: Yes     Within the past 12 months, have you been hit, slapped, kicked or otherwise physically hurt by someone?: No     Within the past 12 months, have you been humiliated or emotionally abused in other ways by your partner or ex-partner?: No   Housing Stability: Not on file       FAMILY HISTORY:  No family history on file.    Past/family/social history reviewed and no changes    PHYSICAL EXAMINATION:  Constitutional: aaox3, cooperative, pleasant, not dyspneic/diaphoretic, no acute distress  Vitals reviewed: /79   Pulse 89   Ht 1.6 m (5' 3\")   Wt 75.8 kg (167 lb)   SpO2 99%   BMI 29.58 kg/m    Wt:   Wt Readings from Last 2 Encounters:   11/13/24 76.7 kg (169 lb)   10/22/24 76.2 kg (167 lb 15.9 oz)      Eyes: Sclera anicteric/injected  Respiratory: Unlabored breathing  Abd: soft,  Nondistended, nontender, no peritoneal signs  Skin: warm, perfused, no jaundice  Psych: Normal affect  MSK: Normal gait      PERTINENT STUDIES:    Office Visit on 10/03/2024   Component Date Value Ref Range Status    Sodium 10/03/2024 142  135 - 145 mmol/L Final    Potassium 10/03/2024 4.6  3.4 - 5.3 mmol/L Final    Chloride 10/03/2024 104  94 - 109 mmol/L Final    Carbon Dioxide (CO2) 10/03/2024 30  20 - 32 mmol/L Final    Anion Gap 10/03/2024 8  3 - 14 mmol/L Final    Urea Nitrogen 10/03/2024 8  7 - 30 mg/dL Final    Creatinine 10/03/2024 0.80  0.52 - 1.04 mg/dL Final    GFR Estimate 10/03/2024 >90  >60 mL/min/1.73m2 Final    Calcium 10/03/2024 10.5 (H)  8.5 - 10.1 mg/dL Final    Glucose 10/03/2024 97  70 - 99 mg/dL Final    Alkaline Phosphatase 10/03/2024 62  40 - 150 U/L Final    AST 10/03/2024 22  0 - 45 U/L Final    ALT 10/03/2024 11  0 - 50 U/L Final    Protein Total 10/03/2024 8.5  6.8 - 8.8 g/dL Final    Albumin 10/03/2024 4.5  3.4 - 5.0 g/dL Final    Bilirubin Total 10/03/2024 0.7  0.2 - 1.3 mg/dL Final    " Lipase 10/03/2024 24  13 - 60 U/L Final    Amylase 10/03/2024 58  28 - 100 U/L Final    WBC Count 10/03/2024 10.8  4.0 - 11.0 10e3/uL Final    RBC Count 10/03/2024 4.94  3.80 - 5.20 10e6/uL Final    Hemoglobin 10/03/2024 13.5  11.7 - 15.7 g/dL Final    Hematocrit 10/03/2024 42.6  35.0 - 47.0 % Final    MCV 10/03/2024 86  78 - 100 fL Final    MCH 10/03/2024 27.3  26.5 - 33.0 pg Final    MCHC 10/03/2024 31.7  31.5 - 36.5 g/dL Final    RDW 10/03/2024 13.5  10.0 - 15.0 % Final    Platelet Count 10/03/2024 337  150 - 450 10e3/uL Final    % Neutrophils 10/03/2024 73  % Final    % Lymphocytes 10/03/2024 20  % Final    % Monocytes 10/03/2024 6  % Final    % Eosinophils 10/03/2024 1  % Final    % Basophils 10/03/2024 1  % Final    % Immature Granulocytes 10/03/2024 0  % Final    Absolute Neutrophils 10/03/2024 7.9  1.6 - 8.3 10e3/uL Final    Absolute Lymphocytes 10/03/2024 2.1  0.8 - 5.3 10e3/uL Final    Absolute Monocytes 10/03/2024 0.6  0.0 - 1.3 10e3/uL Final    Absolute Eosinophils 10/03/2024 0.1  0.0 - 0.7 10e3/uL Final    Absolute Basophils 10/03/2024 0.1  0.0 - 0.2 10e3/uL Final    Absolute Immature Granulocytes 10/03/2024 0.0  <=0.4 10e3/uL Final

## 2025-04-09 NOTE — NURSING NOTE
"Chief Complaint   Patient presents with    Follow Up     She requests these members of her care team be copied on today's visit information:  PCP: No Ref-Primary, Physician    Referring Provider:  Cheri Hood [place]  [id number] [place] [address]    Vitals:    04/09/25 1238   BP: 117/79   Pulse: 89   SpO2: 99%   Weight: 75.8 kg (167 lb)   Height: 1.6 m (5' 3\")     Body mass index is 29.58 kg/m .    Do you have a history of colon cancer in your immediate family? NO    Medications were reconciled.    Does patient need any medication refills at today's visit? No    Tamera Giron, Clinical Assistant      "

## 2025-04-10 LAB — IGA SERPL-MCNC: 162 MG/DL (ref 84–499)

## 2025-04-13 LAB
TTG IGA SER-ACNC: 0.4 U/ML
TTG IGG SER-ACNC: <0.6 U/ML

## 2025-05-08 ENCOUNTER — HOSPITAL ENCOUNTER (EMERGENCY)
Facility: CLINIC | Age: 25
Discharge: HOME | End: 2025-05-09
Attending: EMERGENCY MEDICINE
Payer: COMMERCIAL

## 2025-05-08 ENCOUNTER — APPOINTMENT (OUTPATIENT)
Dept: GENERAL RADIOLOGY | Facility: CLINIC | Age: 25
End: 2025-05-08
Attending: EMERGENCY MEDICINE
Payer: COMMERCIAL

## 2025-05-08 DIAGNOSIS — R07.9 CHEST PAIN, UNSPECIFIED TYPE: ICD-10-CM

## 2025-05-08 LAB
ANION GAP SERPL CALCULATED.3IONS-SCNC: 11 MMOL/L (ref 7–15)
ATRIAL RATE - MUSE: 86 BPM
BASOPHILS # BLD AUTO: 0.1 10E3/UL (ref 0–0.2)
BASOPHILS NFR BLD AUTO: 1 %
BUN SERPL-MCNC: 11.6 MG/DL (ref 6–20)
CALCIUM SERPL-MCNC: 9.7 MG/DL (ref 8.8–10.4)
CHLORIDE SERPL-SCNC: 104 MMOL/L (ref 98–107)
CREAT SERPL-MCNC: 0.71 MG/DL (ref 0.51–0.95)
DIASTOLIC BLOOD PRESSURE - MUSE: NORMAL MMHG
EGFRCR SERPLBLD CKD-EPI 2021: >90 ML/MIN/1.73M2
EOSINOPHIL # BLD AUTO: 0.2 10E3/UL (ref 0–0.7)
EOSINOPHIL NFR BLD AUTO: 2 %
ERYTHROCYTE [DISTWIDTH] IN BLOOD BY AUTOMATED COUNT: 14 % (ref 10–15)
GLUCOSE SERPL-MCNC: 91 MG/DL (ref 70–99)
HCO3 SERPL-SCNC: 24 MMOL/L (ref 22–29)
HCT VFR BLD AUTO: 38.6 % (ref 35–47)
HGB BLD-MCNC: 12.6 G/DL (ref 11.7–15.7)
IMM GRANULOCYTES # BLD: 0 10E3/UL
IMM GRANULOCYTES NFR BLD: 0 %
INTERPRETATION ECG - MUSE: NORMAL
LYMPHOCYTES # BLD AUTO: 2.8 10E3/UL (ref 0.8–5.3)
LYMPHOCYTES NFR BLD AUTO: 28 %
MCH RBC QN AUTO: 27.6 PG (ref 26.5–33)
MCHC RBC AUTO-ENTMCNC: 32.6 G/DL (ref 31.5–36.5)
MCV RBC AUTO: 85 FL (ref 78–100)
MONOCYTES # BLD AUTO: 0.8 10E3/UL (ref 0–1.3)
MONOCYTES NFR BLD AUTO: 8 %
NEUTROPHILS # BLD AUTO: 6.3 10E3/UL (ref 1.6–8.3)
NEUTROPHILS NFR BLD AUTO: 62 %
NRBC # BLD AUTO: 0 10E3/UL
NRBC BLD AUTO-RTO: 0 /100
P AXIS - MUSE: 47 DEGREES
PLATELET # BLD AUTO: 310 10E3/UL (ref 150–450)
POTASSIUM SERPL-SCNC: 3.4 MMOL/L (ref 3.4–5.3)
PR INTERVAL - MUSE: 130 MS
QRS DURATION - MUSE: 76 MS
QT - MUSE: 360 MS
QTC - MUSE: 430 MS
R AXIS - MUSE: 66 DEGREES
RBC # BLD AUTO: 4.57 10E6/UL (ref 3.8–5.2)
SODIUM SERPL-SCNC: 139 MMOL/L (ref 135–145)
SYSTOLIC BLOOD PRESSURE - MUSE: NORMAL MMHG
T AXIS - MUSE: 37 DEGREES
TROPONIN T SERPL HS-MCNC: <6 NG/L
VENTRICULAR RATE- MUSE: 86 BPM
WBC # BLD AUTO: 10.2 10E3/UL (ref 4–11)

## 2025-05-08 PROCEDURE — 99285 EMERGENCY DEPT VISIT HI MDM: CPT | Mod: 25

## 2025-05-08 PROCEDURE — 93005 ELECTROCARDIOGRAM TRACING: CPT

## 2025-05-08 PROCEDURE — 71046 X-RAY EXAM CHEST 2 VIEWS: CPT

## 2025-05-08 PROCEDURE — 82310 ASSAY OF CALCIUM: CPT | Performed by: EMERGENCY MEDICINE

## 2025-05-08 PROCEDURE — 36415 COLL VENOUS BLD VENIPUNCTURE: CPT | Performed by: EMERGENCY MEDICINE

## 2025-05-08 PROCEDURE — 85025 COMPLETE CBC W/AUTO DIFF WBC: CPT | Performed by: EMERGENCY MEDICINE

## 2025-05-08 PROCEDURE — 84484 ASSAY OF TROPONIN QUANT: CPT | Performed by: EMERGENCY MEDICINE

## 2025-05-08 ASSESSMENT — COLUMBIA-SUICIDE SEVERITY RATING SCALE - C-SSRS
2. HAVE YOU ACTUALLY HAD ANY THOUGHTS OF KILLING YOURSELF IN THE PAST MONTH?: NO
6. HAVE YOU EVER DONE ANYTHING, STARTED TO DO ANYTHING, OR PREPARED TO DO ANYTHING TO END YOUR LIFE?: NO
1. IN THE PAST MONTH, HAVE YOU WISHED YOU WERE DEAD OR WISHED YOU COULD GO TO SLEEP AND NOT WAKE UP?: NO

## 2025-05-08 ASSESSMENT — ACTIVITIES OF DAILY LIVING (ADL)
ADLS_ACUITY_SCORE: 41
ADLS_ACUITY_SCORE: 41

## 2025-05-09 VITALS
WEIGHT: 164.02 LBS | SYSTOLIC BLOOD PRESSURE: 119 MMHG | HEIGHT: 63 IN | DIASTOLIC BLOOD PRESSURE: 87 MMHG | BODY MASS INDEX: 29.06 KG/M2 | OXYGEN SATURATION: 100 % | RESPIRATION RATE: 16 BRPM | HEART RATE: 82 BPM | TEMPERATURE: 98.1 F

## 2025-05-09 LAB
ATRIAL RATE - MUSE: 86 BPM
DIASTOLIC BLOOD PRESSURE - MUSE: NORMAL MMHG
HOLD SPECIMEN: NORMAL
HOLD SPECIMEN: NORMAL
INTERPRETATION ECG - MUSE: NORMAL
P AXIS - MUSE: 47 DEGREES
PR INTERVAL - MUSE: 130 MS
QRS DURATION - MUSE: 76 MS
QT - MUSE: 360 MS
QTC - MUSE: 430 MS
R AXIS - MUSE: 66 DEGREES
SYSTOLIC BLOOD PRESSURE - MUSE: NORMAL MMHG
T AXIS - MUSE: 37 DEGREES
VENTRICULAR RATE- MUSE: 86 BPM

## 2025-05-09 NOTE — ED PROVIDER NOTES
"  Emergency Department Note      History of Present Illness     Chief Complaint   Chest Pain      HPI   Khai Calix is a 25 year old female with a history of POTS who presents emergency department concerns for left-sided chest pain starting 2 to 3 days ago sometimes radiating to just below her collarbone and into her left shoulder.  Denies any associated left arm numbness, tingling or weakness.  She denies any swelling or color change over the affected areas.  She denies any leg pain or swelling.  She denies associated chest pain.  She denies fever or chills.  She denies cough or congestion.  She denies any injury or overuse.  She does not lift weights or workout.  She did go to a trip 3 weeks ago to [place], denies immobilization, recent trauma to her legs, surgery, history of hormone therapy use, pregnancy, past DVT or PE or known clotting disorder, or family history of that.  She denies any early family history of coronary artery disease.  She has not taken anything for pain.    Independent Historian   None    Review of External Notes   None    Past Medical History     Medical History and Problem List   Past Medical History:   Diagnosis Date    POTS (postural orthostatic tachycardia syndrome) 2016       Medications   famotidine (PEPCID) 20 MG tablet  ondansetron (ZOFRAN ODT) 4 MG ODT tab        Surgical History   Past Surgical History:   Procedure Laterality Date    APPENDECTOMY  2019    COLONOSCOPY N/A 12/16/2024    Procedure: Colonoscopy;  Surgeon: Cheri Hood DO;  Location: [facility] OR    COLONOSCOPY N/A 12/16/2024    Procedure: COLONOSCOPY, WITH POLYPECTOMY AND BIOPSY;  Surgeon: Cheri Hood DO;  Location: [facility] OR       Physical Exam     Patient Vitals for the past 24 hrs:   BP Temp Temp src Pulse Resp SpO2 Height Weight   05/08/25 2103 133/90 98.1  F (36.7  C) Oral 104 18 100 % 1.6 m (5' 3\") 74.4 kg (164 lb 0.4 oz)     Physical Exam  General: Adult sitting upright  Eyes: PERRL, Conjunctive within " normal limits  ENT: Moist mucous membranes, oropharynx clear.   CV: Normal S1S2, no murmur, rub or gallop. Regular rate and rhythm.  Radial pulses palpable bilaterally.  Resp: Clear to auscultation bilaterally, no wheezes, rales or rhonchi. Normal respiratory effort.  GI: Abdomen is soft, nontender and nondistended. No palpable masses. No rebound or guarding.  MSK: Left lateral chest wall without palpable crepitus or edema.  Lower extremity no edema. Nontender extremities. Normal active range of motion.  Skin: Warm and dry. No rashes or lesions or ecchymoses on visible skin.  Neuro: Alert and oriented. Responds appropriately to all questions and commands. No focal findings appreciated. Normal muscle tone.  Psych: Normal mood and affect. Pleasant.     Diagnostics     Lab Results   Labs Ordered and Resulted from Time of ED Arrival to Time of ED Departure - No data to display    Imaging   Chest XR,  PA & LAT    (Results Pending)       EKG   ECG taken at 2115, ECG read at 2240  ECG results from 05/08/25   EKG 12 lead     Value    Systolic Blood Pressure     Diastolic Blood Pressure     Ventricular Rate 86    Atrial Rate 86    NM Interval 130    QRS Duration 76        QTc 430    P Axis 47    R AXIS 66    T Axis 37    Interpretation ECG      Sinus rhythm  Normal ECG  No previous ECGs available           Independent Interpretation   CXR: {CXR EPPA:299078}.    ED Course      Medications Administered   Medications - No data to display      Discussion of Management   None    ED Course   Past medical records, nursing notes, and vitals reviewed.   I performed an exam of the patient and obtained history, as documented above.     Additional Documentation  None    Medical Decision Making / Diagnosis     CMS Diagnoses: None    MIPS            None    MDM   Khai Calix is a 25 year old female ***    Disposition   The patient was discharged.     Diagnosis   No diagnosis found.     Discharge Medications   New Prescriptions     No medications on file         Lexis Saldivar MD     MD Sara  05/09/25 0327

## 2025-05-09 NOTE — ED TRIAGE NOTES
Patient reports left sided chest pain that radiates into her left shoulder. Patient states this started one day ago. Patient denies injury to shoulder.      Triage Assessment (Adult)       Row Name 05/08/25 9039          Triage Assessment    Airway WDL WDL        Respiratory WDL    Respiratory WDL WDL        Skin Circulation/Temperature WDL    Skin Circulation/Temperature WDL WDL        Cardiac WDL    Cardiac WDL WDL        Peripheral/Neurovascular WDL    Peripheral Neurovascular WDL WDL        Cognitive/Neuro/Behavioral WDL    Cognitive/Neuro/Behavioral WDL WDL

## 2025-07-30 ENCOUNTER — OFFICE VISIT (OUTPATIENT)
Dept: GASTROENTEROLOGY | Facility: CLINIC | Age: 25
End: 2025-07-30
Attending: PHYSICIAN ASSISTANT
Payer: COMMERCIAL

## 2025-07-30 VITALS
SYSTOLIC BLOOD PRESSURE: 111 MMHG | DIASTOLIC BLOOD PRESSURE: 79 MMHG | WEIGHT: 162.3 LBS | OXYGEN SATURATION: 99 % | BODY MASS INDEX: 28.76 KG/M2 | HEART RATE: 96 BPM | HEIGHT: 63 IN

## 2025-07-30 DIAGNOSIS — R10.12 LUQ ABDOMINAL PAIN: Primary | ICD-10-CM

## 2025-07-30 DIAGNOSIS — R11.0 NAUSEA: ICD-10-CM

## 2025-07-30 DIAGNOSIS — K62.5 BRBPR (BRIGHT RED BLOOD PER RECTUM): ICD-10-CM

## 2025-07-30 DIAGNOSIS — R10.32 ABDOMINAL PAIN, LEFT LOWER QUADRANT: ICD-10-CM

## 2025-07-30 PROCEDURE — 3074F SYST BP LT 130 MM HG: CPT | Performed by: PHYSICIAN ASSISTANT

## 2025-07-30 PROCEDURE — 3078F DIAST BP <80 MM HG: CPT | Performed by: PHYSICIAN ASSISTANT

## 2025-07-30 PROCEDURE — 1125F AMNT PAIN NOTED PAIN PRSNT: CPT | Performed by: PHYSICIAN ASSISTANT

## 2025-07-30 PROCEDURE — 99213 OFFICE O/P EST LOW 20 MIN: CPT | Performed by: PHYSICIAN ASSISTANT

## 2025-07-30 RX ORDER — POLYETHYLENE GLYCOL 3350 17 G/17G
1 POWDER, FOR SOLUTION ORAL DAILY
COMMUNITY

## 2025-07-30 ASSESSMENT — PAIN SCALES - GENERAL: PAINLEVEL_OUTOF10: MODERATE PAIN (4)

## 2025-07-30 NOTE — NURSING NOTE
"Chief Complaint   Patient presents with    Follow Up     3 month follow up for nausea, abdominal bloating, LLQ abdominal pain, and BRBPR.     Vitals:    07/30/25 1239   BP: 111/79   BP Location: Left arm   Patient Position: Sitting   Cuff Size: Adult Regular   Pulse: 96   SpO2: 99%   Weight: 73.6 kg (162 lb 4.8 oz)   Height: 1.6 m (5' 3\")     Body mass index is 28.75 kg/m .    Medications were reconciled.    Does patient need any medication refills at today's visit? No        Stephie Hamilton CMA        "

## 2025-07-30 NOTE — LETTER
7/30/2025      Khai Calix  [address]  [place] [address]      Dear Colleague,    Thank you for referring your patient, Khai Calix, to the [facility]. Please see a copy of my visit note below.    GI CLINIC VISIT    CC/REFERRING MD:  Cheri Hood      ASSESSMENT/PLAN:    #abdominal pain  #?constipation  #?IBS  #occasional nausea  #BRBPR  AXR was concerning for constipation and patient did start miralax 1 capful daily (took up to 1.5) but did not notice a difference in her pain, stools are now described as a bristol type 5. She does note LUQ abdominal pain at times as well, heartburn can occur a couple of times a week. Continues to note occasional BRBPR - suspect hemorrhoidal bleeding. Will have her obtain EGD and GES given upper abdominal pain. We again discussed disorders of the gut brain axis. If above work up is unrevealing, could consider nutrition and GI health psych referral. Has been seen by OBGYN and pain not felt to be related to any GYN etiologies.   --obtain EGD and GES.   --consider increasing miralax to 2 capfuls daily -- if loose stools, can go back to 1 capful daily.  --future considerations: if above work up unrevealing. Consider nutrition and GI health psych referral as well as bentyl PRN.         RTC 3 months.     Thank you for this consultation.  It was a pleasure to participate in the care of this patient; please contact us with any further questions.     This note was created with voice recognition software, and while reviewed for accuracy, typos may remain.    Emiliana Sanchez PA-C  Division of Gastroenterology, Hepatology and Nutrition  [facility]    20 minutes spent on the date of the encounter doing chart review, review of test results, patient visit, and documentation          HPI  Patient presents for follow up.     Patient was initially evaluated by Dr. Hood on 11/13/24, please see visit  "details below:    Khai presents today to discuss abdominal pain - pain was mainly in the LUQ and LLQ. Some associated nausea but no vomiting. Has also been having blood mixed with stool. No changes in medications around the time this started. Initially presented to urgent care with these symptoms on 10/3 - labs at the time were unrevealing and referral to GI placed. Presented to the ER on 10/22 with persistent symptoms - had a CT scan which was notable for large stool burden but otherwise unrevealing. Was recommended to use miralax but she instead tried magnesium citrate - had some soft bowel movements with this. Has been feeling better this week - abdominal pain has resolved.  Having 2-3 soft small bowel movements a day although still seeing blood in the stool.     No family history of IBD. No autoimmune disease that run in the family. Khai has a history of POTS.  Had an appendectomy in 2019 - no other abdominal surgeries.  No prior endoscopies    4/9/25:  Colonoscopy done in December 2024 was normal.   Pain continues to be intermittent in nature -- usually localized to LLQ of abdomen - pain is described as a \"cramp\", if she moves or eats something can be more stabbing in nature. Unable to identify triggers. Unable to identify anything that improves the pain. Pain can last a day or two. She will generally have a BM daily described as a bristol type 4, does endorse some pushing, will have BRBPR intermittently. Can have some fecal urgency depending on what she eats. Reports occasional nausea, 1-2x/week. Can come on at random, usually after eating. She takes zofran as needed. Denies vomiting. Patient is wondering about MCAS.      Denies daily NSAIDs or Tylenol. Denies use of OTC herbal supplements/weight loss products.      Drinks alcohol once a week (usually 3 drinks).  Denies tobacco products. No recreational drug use.     No family history of GI related malignancy (esophageal, gastric, pancreatic, liver or colon) " or family history of IBD/celiac disease.     7/29/25:  Patient is taking miralax 1 capful daily -- no change in symptoms. She has left sided abdominal pain daily, at times can be in LLQ other times LUQ. At times can be related to eating, other times it comes on at random. There is always a baseline level of pain. Having a BM does not seem to improve the pain. Can have heartburn symptoms at times. Denies odynophagia or dypshagia.     She notes will have 1 BM daily, described as a bristol type 5. She does have occasional BRBPR.     ROS:    No fevers or chills  No weight loss  +BRBPR on occasion  No anxiety or depression      PROBLEM LIST  Patient Active Problem List    Diagnosis Date Noted     POTS (postural orthostatic tachycardia syndrome) 01/22/2018     Priority: Medium     Formatting of this note is different from the original. Echo 2/19/18  1. Normal left ventricular systolic function.  2. Normal origins of the right and left coronary arteries.       Other specified cardiac arrhythmias 01/22/2018     Priority: Medium     Formatting of this note is different from the original. Formatting of this note is different from the original. Echo 2/19/18  1. Normal left ventricular systolic function.  2. Normal origins of the right and left coronary arteries.       Chronic tension headache 09/20/2017     Priority: Medium     Vocal cord disease 09/09/2015     Priority: Medium     Formatting of this note might be different from the original. Unsure if vocal cord dysfunction is the cause of exercise associated dyspnea and chest pain.  Feels like she can't get air out.  Sometimes gets dizzy and feels chest pain. It never happens when sitting at home. This started summer, 2015.  Flex laryngoscopy by Voice Therapy on 9/9/15 with some twitchiness of vocal cords at rest thought to be consistent with vocal cord dysfunction. Speech therapy techniques taught but no improvement in symptoms. Seen by Pulmonary 11/15 who did not think it  was asthma and recommended Cardiology consult.  That has not been done and Khai and Mom (per Khai telling me) are interested in accomplishing this consult. 6/29/16: Cardiology consult placed.  EKG ordered         PERTINENT PAST MEDICAL HISTORY:  Past Medical History:   Diagnosis Date     POTS (postural orthostatic tachycardia syndrome) 2016       PREVIOUS SURGERIES:  Past Surgical History:   Procedure Laterality Date     APPENDECTOMY  2019     COLONOSCOPY N/A 12/16/2024    Procedure: Colonoscopy;  Surgeon: Cheri Hood DO;  Location: [facility] OR     COLONOSCOPY N/A 12/16/2024    Procedure: COLONOSCOPY, WITH POLYPECTOMY AND BIOPSY;  Surgeon: Cheri Hood DO;  Location: [facility] OR       PREVIOUS ENDOSCOPY:  See chart.    ALLERGIES:   No Known Allergies    PERTINENT MEDICATIONS:    Current Outpatient Medications:      famotidine (PEPCID) 20 MG tablet, Take 1 tablet (20 mg) by mouth 2 times daily (Patient not taking: Reported on 4/9/2025), Disp: 60 tablet, Rfl: 0     ondansetron (ZOFRAN ODT) 4 MG ODT tab, Take 1 tablet (4 mg) by mouth every 8 hours as needed for nausea or vomiting., Disp: 12 tablet, Rfl: 0    SOCIAL HISTORY:  Social History     Socioeconomic History     Marital status: Single     Spouse name: Not on file     Number of children: Not on file     Years of education: Not on file     Highest education level: Not on file   Occupational History     Not on file   Tobacco Use     Smoking status: Never     Smokeless tobacco: Never   Vaping Use     Vaping status: Never Used   Substance and Sexual Activity     Alcohol use: Not on file     Drug use: Not on file     Sexual activity: Not on file   Other Topics Concern     Not on file   Social History Narrative     Not on file     Social Drivers of Health     Financial Resource Strain: Unknown (3/5/2022)    Received [facility] [place] [facility] [place]    Financial Resource Strain      Overall Financial Strain: 99      Skipped Doctor's Visit: 3       Skipped Medication due to cost: 3      Utility Shut-offs: 3   Food Insecurity: Not on file   Transportation Needs: Not on file   Physical Activity: Insufficiently Active (10/3/2019)    Received from [facility] [place]    Exercise Vital Sign      Days of Exercise per Week: 2 days      Minutes of Exercise per Session: 60 min   Stress: No Stress Concern Present (10/3/2019)    Received from [facility]    [employer] [facility] [employer] - Occupational Stress Questionnaire      Feeling of Stress : Not at all   Social Connections: Unknown (12/23/2022)    Received from [facility]    Social Connections      Frequency of Communication with Friends and Family: Not on file   Interpersonal Safety: Low Risk  (12/16/2024)    Interpersonal Safety      Do you feel physically and emotionally safe where you currently live?: Yes      Within the past 12 months, have you been hit, slapped, kicked or otherwise physically hurt by someone?: No      Within the past 12 months, have you been humiliated or emotionally abused in other ways by your partner or ex-partner?: No   Housing Stability: Not on file       FAMILY HISTORY:  No family history on file.    Past/family/social history reviewed and no changes    PHYSICAL EXAMINATION:  Constitutional: aaox3, cooperative, pleasant, not dyspneic/diaphoretic, no acute distress  Vitals reviewed: There were no vitals taken for this visit.  Wt:   Wt Readings from Last 2 Encounters:   05/08/25 74.4 kg (164 lb 0.4 oz)   04/09/25 75.8 kg (167 lb)      Eyes: Sclera anicteric/injected  Respiratory: Unlabored breathing  Abd: soft,  Nondistended, tender in LUQ and LLQ of abdomen.   Skin: warm, perfused, no jaundice  Psych: Normal affect  MSK: Normal gait      PERTINENT STUDIES:    Office Visit on 10/03/2024   Component Date Value Ref Range Status     Sodium 10/03/2024 142  135 - 145 mmol/L Final     Potassium 10/03/2024 4.6  3.4 - 5.3 mmol/L Final     Chloride 10/03/2024  104  94 - 109 mmol/L Final     Carbon Dioxide (CO2) 10/03/2024 30  20 - 32 mmol/L Final     Anion Gap 10/03/2024 8  3 - 14 mmol/L Final     Urea Nitrogen 10/03/2024 8  7 - 30 mg/dL Final     Creatinine 10/03/2024 0.80  0.52 - 1.04 mg/dL Final     GFR Estimate 10/03/2024 >90  >60 mL/min/1.73m2 Final     Calcium 10/03/2024 10.5 (H)  8.5 - 10.1 mg/dL Final     Glucose 10/03/2024 97  70 - 99 mg/dL Final     Alkaline Phosphatase 10/03/2024 62  40 - 150 U/L Final     AST 10/03/2024 22  0 - 45 U/L Final     ALT 10/03/2024 11  0 - 50 U/L Final     Protein Total 10/03/2024 8.5  6.8 - 8.8 g/dL Final     Albumin 10/03/2024 4.5  3.4 - 5.0 g/dL Final     Bilirubin Total 10/03/2024 0.7  0.2 - 1.3 mg/dL Final     Lipase 10/03/2024 24  13 - 60 U/L Final     Amylase 10/03/2024 58  28 - 100 U/L Final     WBC Count 10/03/2024 10.8  4.0 - 11.0 10e3/uL Final     RBC Count 10/03/2024 4.94  3.80 - 5.20 10e6/uL Final     Hemoglobin 10/03/2024 13.5  11.7 - 15.7 g/dL Final     Hematocrit 10/03/2024 42.6  35.0 - 47.0 % Final     MCV 10/03/2024 86  78 - 100 fL Final     MCH 10/03/2024 27.3  26.5 - 33.0 pg Final     MCHC 10/03/2024 31.7  31.5 - 36.5 g/dL Final     RDW 10/03/2024 13.5  10.0 - 15.0 % Final     Platelet Count 10/03/2024 337  150 - 450 10e3/uL Final     % Neutrophils 10/03/2024 73  % Final     % Lymphocytes 10/03/2024 20  % Final     % Monocytes 10/03/2024 6  % Final     % Eosinophils 10/03/2024 1  % Final     % Basophils 10/03/2024 1  % Final     % Immature Granulocytes 10/03/2024 0  % Final     Absolute Neutrophils 10/03/2024 7.9  1.6 - 8.3 10e3/uL Final     Absolute Lymphocytes 10/03/2024 2.1  0.8 - 5.3 10e3/uL Final     Absolute Monocytes 10/03/2024 0.6  0.0 - 1.3 10e3/uL Final     Absolute Eosinophils 10/03/2024 0.1  0.0 - 0.7 10e3/uL Final     Absolute Basophils 10/03/2024 0.1  0.0 - 0.2 10e3/uL Final     Absolute Immature Granulocytes 10/03/2024 0.0  <=0.4 10e3/uL Final          Again, thank you for allowing me to  participate in the care of your patient.        Sincerely,        Emiliana Sanchez PA-C    Electronically signed

## 2025-07-30 NOTE — PROGRESS NOTES
GI CLINIC VISIT    CC/REFERRING MD:  Cheri Hood      ASSESSMENT/PLAN:    #abdominal pain  #?constipation  #?IBS  #occasional nausea  #BRBPR  AXR was concerning for constipation and patient did start miralax 1 capful daily (took up to 1.5) but did not notice a difference in her pain, stools are now described as a bristol type 5. She does note LUQ abdominal pain at times as well, heartburn can occur a couple of times a week. Continues to note occasional BRBPR - suspect hemorrhoidal bleeding. Will have her obtain EGD and GES given upper abdominal pain. We again discussed disorders of the gut brain axis. If above work up is unrevealing, could consider nutrition and GI health psych referral. Has been seen by OBGYN and pain not felt to be related to any GYN etiologies.   --obtain EGD and GES.   --consider increasing miralax to 2 capfuls daily -- if loose stools, can go back to 1 capful daily.  --future considerations: if above work up unrevealing. Consider nutrition and GI health psych referral as well as bentyl PRN.         RTC 3 months.     Thank you for this consultation.  It was a pleasure to participate in the care of this patient; please contact us with any further questions.     This note was created with voice recognition software, and while reviewed for accuracy, typos may remain.    Emiliana Sanchez PA-C  Division of Gastroenterology, Hepatology and Nutrition  [facility]    20 minutes spent on the date of the encounter doing chart review, review of test results, patient visit, and documentation          HPI  Patient presents for follow up.     Patient was initially evaluated by Dr. Hood on 11/13/24, please see visit details below:    Khai presents today to discuss abdominal pain - pain was mainly in the LUQ and LLQ. Some associated nausea but no vomiting. Has also been having blood mixed with stool. No changes in medications around the time this started.  "Initially presented to urgent care with these symptoms on 10/3 - labs at the time were unrevealing and referral to GI placed. Presented to the ER on 10/22 with persistent symptoms - had a CT scan which was notable for large stool burden but otherwise unrevealing. Was recommended to use miralax but she instead tried magnesium citrate - had some soft bowel movements with this. Has been feeling better this week - abdominal pain has resolved.  Having 2-3 soft small bowel movements a day although still seeing blood in the stool.     No family history of IBD. No autoimmune disease that run in the family. Khai has a history of POTS.  Had an appendectomy in 2019 - no other abdominal surgeries.  No prior endoscopies    4/9/25:  Colonoscopy done in December 2024 was normal.   Pain continues to be intermittent in nature -- usually localized to LLQ of abdomen - pain is described as a \"cramp\", if she moves or eats something can be more stabbing in nature. Unable to identify triggers. Unable to identify anything that improves the pain. Pain can last a day or two. She will generally have a BM daily described as a bristol type 4, does endorse some pushing, will have BRBPR intermittently. Can have some fecal urgency depending on what she eats. Reports occasional nausea, 1-2x/week. Can come on at random, usually after eating. She takes zofran as needed. Denies vomiting. Patient is wondering about MCAS.      Denies daily NSAIDs or Tylenol. Denies use of OTC herbal supplements/weight loss products.      Drinks alcohol once a week (usually 3 drinks).  Denies tobacco products. No recreational drug use.     No family history of GI related malignancy (esophageal, gastric, pancreatic, liver or colon) or family history of IBD/celiac disease.     7/29/25:  Patient is taking miralax 1 capful daily -- no change in symptoms. She has left sided abdominal pain daily, at times can be in LLQ other times LUQ. At times can be related to eating, other " times it comes on at random. There is always a baseline level of pain. Having a BM does not seem to improve the pain. Can have heartburn symptoms at times. Denies odynophagia or dypshagia.     She notes will have 1 BM daily, described as a bristol type 5. She does have occasional BRBPR.     ROS:    No fevers or chills  No weight loss  +BRBPR on occasion  No anxiety or depression      PROBLEM LIST  Patient Active Problem List    Diagnosis Date Noted    POTS (postural orthostatic tachycardia syndrome) 01/22/2018     Priority: Medium     Formatting of this note is different from the original. Echo 2/19/18  1. Normal left ventricular systolic function.  2. Normal origins of the right and left coronary arteries.      Other specified cardiac arrhythmias 01/22/2018     Priority: Medium     Formatting of this note is different from the original. Formatting of this note is different from the original. Echo 2/19/18  1. Normal left ventricular systolic function.  2. Normal origins of the right and left coronary arteries.      Chronic tension headache 09/20/2017     Priority: Medium    Vocal cord disease 09/09/2015     Priority: Medium     Formatting of this note might be different from the original. Unsure if vocal cord dysfunction is the cause of exercise associated dyspnea and chest pain.  Feels like she can't get air out.  Sometimes gets dizzy and feels chest pain. It never happens when sitting at home. This started summer, 2015.  Flex laryngoscopy by Voice Therapy on 9/9/15 with some twitchiness of vocal cords at rest thought to be consistent with vocal cord dysfunction. Speech therapy techniques taught but no improvement in symptoms. Seen by Pulmonary 11/15 who did not think it was asthma and recommended Cardiology consult.  That has not been done and Khai and Mom (per Khai telling me) are interested in accomplishing this consult. 6/29/16: Cardiology consult placed.  EKG ordered         PERTINENT PAST MEDICAL  HISTORY:  Past Medical History:   Diagnosis Date    POTS (postural orthostatic tachycardia syndrome) 2016       PREVIOUS SURGERIES:  Past Surgical History:   Procedure Laterality Date    APPENDECTOMY  2019    COLONOSCOPY N/A 12/16/2024    Procedure: Colonoscopy;  Surgeon: Cheri Hood DO;  Location: [facility] OR    COLONOSCOPY N/A 12/16/2024    Procedure: COLONOSCOPY, WITH POLYPECTOMY AND BIOPSY;  Surgeon: Cheri Hood DO;  Location: [facility] OR       PREVIOUS ENDOSCOPY:  See chart.    ALLERGIES:   No Known Allergies    PERTINENT MEDICATIONS:    Current Outpatient Medications:     famotidine (PEPCID) 20 MG tablet, Take 1 tablet (20 mg) by mouth 2 times daily (Patient not taking: Reported on 4/9/2025), Disp: 60 tablet, Rfl: 0    ondansetron (ZOFRAN ODT) 4 MG ODT tab, Take 1 tablet (4 mg) by mouth every 8 hours as needed for nausea or vomiting., Disp: 12 tablet, Rfl: 0    SOCIAL HISTORY:  Social History     Socioeconomic History    Marital status: Single     Spouse name: Not on file    Number of children: Not on file    Years of education: Not on file    Highest education level: Not on file   Occupational History    Not on file   Tobacco Use    Smoking status: Never    Smokeless tobacco: Never   Vaping Use    Vaping status: Never Used   Substance and Sexual Activity    Alcohol use: Not on file    Drug use: Not on file    Sexual activity: Not on file   Other Topics Concern    Not on file   Social History Narrative    Not on file     Social Drivers of Health     Financial Resource Strain: Unknown (3/5/2022)    Received from [facility] [place] [facility] [place]    Financial Resource Strain     Overall Financial Strain: 99     Skipped Doctor's Visit: 3     Skipped Medication due to cost: 3     Utility Shut-offs: 3   Food Insecurity: Not on file   Transportation Needs: Not on file   Physical Activity: Insufficiently Active (10/3/2019)    Received from [facility]    Exercise Vital Sign     Days of Exercise per  Week: 2 days     Minutes of Exercise per Session: 60 min   Stress: No Stress Concern Present (10/3/2019)    Received from [facility] - Occupational Stress Questionnaire     Feeling of Stress : Not at all   Social Connections: Unknown (12/23/2022)    Received from [employer] [facility]    Social Connections     Frequency of Communication with Friends and Family: Not on file   Interpersonal Safety: Low Risk  (12/16/2024)    Interpersonal Safety     Do you feel physically and emotionally safe where you currently live?: Yes     Within the past 12 months, have you been hit, slapped, kicked or otherwise physically hurt by someone?: No     Within the past 12 months, have you been humiliated or emotionally abused in other ways by your partner or ex-partner?: No   Housing Stability: Not on file       FAMILY HISTORY:  No family history on file.    Past/family/social history reviewed and no changes    PHYSICAL EXAMINATION:  Constitutional: aaox3, cooperative, pleasant, not dyspneic/diaphoretic, no acute distress  Vitals reviewed: There were no vitals taken for this visit.  Wt:   Wt Readings from Last 2 Encounters:   05/08/25 74.4 kg (164 lb 0.4 oz)   04/09/25 75.8 kg (167 lb)      Eyes: Sclera anicteric/injected  Respiratory: Unlabored breathing  Abd: soft,  Nondistended, tender in LUQ and LLQ of abdomen.   Skin: warm, perfused, no jaundice  Psych: Normal affect  MSK: Normal gait      PERTINENT STUDIES:    Office Visit on 10/03/2024   Component Date Value Ref Range Status    Sodium 10/03/2024 142  135 - 145 mmol/L Final    Potassium 10/03/2024 4.6  3.4 - 5.3 mmol/L Final    Chloride 10/03/2024 104  94 - 109 mmol/L Final    Carbon Dioxide (CO2) 10/03/2024 30  20 - 32 mmol/L Final    Anion Gap 10/03/2024 8  3 - 14 mmol/L Final    Urea Nitrogen 10/03/2024 8  7 - 30 mg/dL Final    Creatinine 10/03/2024 0.80  0.52 - 1.04 mg/dL Final    GFR Estimate 10/03/2024 >90   >60 mL/min/1.73m2 Final    Calcium 10/03/2024 10.5 (H)  8.5 - 10.1 mg/dL Final    Glucose 10/03/2024 97  70 - 99 mg/dL Final    Alkaline Phosphatase 10/03/2024 62  40 - 150 U/L Final    AST 10/03/2024 22  0 - 45 U/L Final    ALT 10/03/2024 11  0 - 50 U/L Final    Protein Total 10/03/2024 8.5  6.8 - 8.8 g/dL Final    Albumin 10/03/2024 4.5  3.4 - 5.0 g/dL Final    Bilirubin Total 10/03/2024 0.7  0.2 - 1.3 mg/dL Final    Lipase 10/03/2024 24  13 - 60 U/L Final    Amylase 10/03/2024 58  28 - 100 U/L Final    WBC Count 10/03/2024 10.8  4.0 - 11.0 10e3/uL Final    RBC Count 10/03/2024 4.94  3.80 - 5.20 10e6/uL Final    Hemoglobin 10/03/2024 13.5  11.7 - 15.7 g/dL Final    Hematocrit 10/03/2024 42.6  35.0 - 47.0 % Final    MCV 10/03/2024 86  78 - 100 fL Final    MCH 10/03/2024 27.3  26.5 - 33.0 pg Final    MCHC 10/03/2024 31.7  31.5 - 36.5 g/dL Final    RDW 10/03/2024 13.5  10.0 - 15.0 % Final    Platelet Count 10/03/2024 337  150 - 450 10e3/uL Final    % Neutrophils 10/03/2024 73  % Final    % Lymphocytes 10/03/2024 20  % Final    % Monocytes 10/03/2024 6  % Final    % Eosinophils 10/03/2024 1  % Final    % Basophils 10/03/2024 1  % Final    % Immature Granulocytes 10/03/2024 0  % Final    Absolute Neutrophils 10/03/2024 7.9  1.6 - 8.3 10e3/uL Final    Absolute Lymphocytes 10/03/2024 2.1  0.8 - 5.3 10e3/uL Final    Absolute Monocytes 10/03/2024 0.6  0.0 - 1.3 10e3/uL Final    Absolute Eosinophils 10/03/2024 0.1  0.0 - 0.7 10e3/uL Final    Absolute Basophils 10/03/2024 0.1  0.0 - 0.2 10e3/uL Final    Absolute Immature Granulocytes 10/03/2024 0.0  <=0.4 10e3/uL Final

## 2025-07-30 NOTE — PATIENT INSTRUCTIONS
It was a pleasure meeting with you today and discussing your healthcare plan. Below is a summary of what we covered:    -schedule upper endoscopy and gastric emptying study.   --try increasing miralax to 2 capfuls daily -- if you have diarrhea, go back to 1 capfuls daily.     Follow up in clinic in 3 months.       Please see below for any additional questions and scheduling guidelines.    Sign up for [employer]: [employer] patient portal serves as a secure platform for accessing your medical records from the [facility]. Additionally, [employer] facilitates easy, timely, and secure messaging with your care team. If you have not signed up, you may do so by using the provided code or calling [phone].    Coordinating your care after your visit:  There are multiple options for scheduling your follow-up care based on your provider's recommendation.    How do I schedule a follow-up clinic appointment:   After your appointment, you may receive scheduling assistance with the Clinic Coordinators by having a seat in the waiting room and a Clinic Coordinator will call you up to schedule.  Virtual visits or after you leave the clinic:  Your provider has placed a follow-up order in the [employer] portal for scheduling your return appointment. A member of the scheduling team will contact you to schedule.  [facility] Scheduling: Timely scheduling through [employer] is advised to ensure appointment availability.   Call to schedule: You may schedule your follow-up appointment(s) by calling [phone], option 1.    How do I schedule my endoscopy or colonoscopy procedure:  If a procedure, such as a colonoscopy or upper endoscopy was ordered by your provider, the scheduling team will contact you to schedule this procedure. Or you may choose to call to schedule at   [phone], option 2.  Please allow 20-30 minutes when scheduling a procedure.    How do I get my blood work done? To get your blood work done, you need to schedule a lab  appointment at an [facility]. There are multiple ways to schedule:   At the clinic: The Clinic Coordinator you meet after your visit can help you schedule a lab appointment.   [facility] scheduling: [employer] offers online lab scheduling at all [facility] laboratory locations.   Call to schedule: You can call [phone] to schedule your lab appointment.    How do I schedule my imaging study: To schedule imaging studies, such as CT scans, ultrasounds, MRIs, or X-rays, contact Imaging Services at [phone].    How do I schedule a referral to another doctor: If your provider recommended a referral to another specialist(s), the referral order was placed by your provider. You will receive a phone call to schedule this referral, or you may choose to call the number attached to the referral to self-schedule.    For Post-Visit Question(s):  For any inquiries following today's visit:  Please utilize [employer] messaging and allow 48 hours for reply or contact the Call Center during normal business hours at [phone], option 3.  For Emergent After-hours questions, contact the On-Call GI Fellow through the [facility]  at [phone].  In addition, you may contact your Nurse directly using the provided contact information.    Test Results:  Test results will be accessible via [employer] in compliance with the 21st Century Cures Act. This means that your results will be available to you at the same time as your provider. Often you may see your results before your provider does. Results are reviewed by staff within two weeks with communication follow-up. Results may be released in the patient portal prior to your care team review.    Prescription Refill(s):  Medication prescribed by your provider will be addressed during your visit. For future refills, please coordinate with your pharmacy. If you have not had a recent clinic visit or routine labs, for your safety, your provider may not be  able to refill your prescription.

## 2025-08-11 ENCOUNTER — TELEPHONE (OUTPATIENT)
Dept: GASTROENTEROLOGY | Facility: CLINIC | Age: 25
End: 2025-08-11
Payer: COMMERCIAL

## 2025-08-25 ENCOUNTER — HOSPITAL ENCOUNTER (OUTPATIENT)
Facility: CLINIC | Age: 25
Discharge: HOME | End: 2025-08-25
Attending: INTERNAL MEDICINE | Admitting: INTERNAL MEDICINE
Payer: COMMERCIAL

## 2025-08-25 ASSESSMENT — ACTIVITIES OF DAILY LIVING (ADL)
ADLS_ACUITY_SCORE: 41

## 2025-09-03 ENCOUNTER — HOSPITAL ENCOUNTER (OUTPATIENT)
Dept: NUCLEAR MEDICINE | Facility: CLINIC | Age: 25
Setting detail: NUCLEAR MEDICINE
Discharge: HOME | End: 2025-09-03
Attending: PHYSICIAN ASSISTANT
Payer: COMMERCIAL

## 2025-09-03 ENCOUNTER — RESULTS FOLLOW-UP (OUTPATIENT)
Dept: GASTROENTEROLOGY | Facility: CLINIC | Age: 25
End: 2025-09-03
Payer: COMMERCIAL

## 2025-09-03 DIAGNOSIS — R10.12 LUQ ABDOMINAL PAIN: ICD-10-CM

## 2025-09-03 DIAGNOSIS — R10.12 LUQ ABDOMINAL PAIN: Primary | ICD-10-CM

## 2025-09-03 PROCEDURE — 78264 GASTRIC EMPTYING IMG STUDY: CPT

## 2025-09-03 PROCEDURE — 343N000001 HC RX 343 MED OP 636: Performed by: PHYSICIAN ASSISTANT

## 2025-09-03 PROCEDURE — A9541 TC99M SULFUR COLLOID: HCPCS | Performed by: PHYSICIAN ASSISTANT

## 2025-09-03 RX ORDER — OMEPRAZOLE 40 MG/1
40 CAPSULE, DELAYED RELEASE ORAL DAILY
Qty: 30 CAPSULE | Refills: 3 | Status: SHIPPED | OUTPATIENT
Start: 2025-09-03 | End: 2026-01-01

## 2025-09-03 RX ADMIN — TECHNETIUM TC 99M SULFUR COLLOID 1 MILLICURIE: KIT at 07:40

## (undated) RX ORDER — DIPHENHYDRAMINE HYDROCHLORIDE 50 MG/ML
INJECTION INTRAMUSCULAR; INTRAVENOUS
Status: DISPENSED
Start: 2024-12-16

## (undated) RX ORDER — FENTANYL CITRATE 50 UG/ML
INJECTION, SOLUTION INTRAMUSCULAR; INTRAVENOUS
Status: DISPENSED
Start: 2024-12-16